# Patient Record
Sex: FEMALE | Race: WHITE | Employment: OTHER | URBAN - NONMETROPOLITAN AREA
[De-identification: names, ages, dates, MRNs, and addresses within clinical notes are randomized per-mention and may not be internally consistent; named-entity substitution may affect disease eponyms.]

---

## 2017-03-22 ENCOUNTER — OFFICE VISIT (OUTPATIENT)
Dept: OBGYN | Age: 77
End: 2017-03-22
Payer: MEDICARE

## 2017-03-22 VITALS
BODY MASS INDEX: 25.65 KG/M2 | HEART RATE: 60 BPM | DIASTOLIC BLOOD PRESSURE: 60 MMHG | HEIGHT: 67 IN | SYSTOLIC BLOOD PRESSURE: 116 MMHG | WEIGHT: 163.4 LBS

## 2017-03-22 DIAGNOSIS — N95.2 VAGINAL ATROPHY: ICD-10-CM

## 2017-03-22 DIAGNOSIS — B96.89 BV (BACTERIAL VAGINOSIS): ICD-10-CM

## 2017-03-22 DIAGNOSIS — N76.0 BV (BACTERIAL VAGINOSIS): ICD-10-CM

## 2017-03-22 DIAGNOSIS — N81.4 UTERINE PROLAPSE: Primary | ICD-10-CM

## 2017-03-22 PROCEDURE — 3014F SCREEN MAMMO DOC REV: CPT | Performed by: OBSTETRICS & GYNECOLOGY

## 2017-03-22 PROCEDURE — 4040F PNEUMOC VAC/ADMIN/RCVD: CPT | Performed by: OBSTETRICS & GYNECOLOGY

## 2017-03-22 PROCEDURE — G8400 PT W/DXA NO RESULTS DOC: HCPCS | Performed by: OBSTETRICS & GYNECOLOGY

## 2017-03-22 PROCEDURE — 1036F TOBACCO NON-USER: CPT | Performed by: OBSTETRICS & GYNECOLOGY

## 2017-03-22 PROCEDURE — 1090F PRES/ABSN URINE INCON ASSESS: CPT | Performed by: OBSTETRICS & GYNECOLOGY

## 2017-03-22 PROCEDURE — 99213 OFFICE O/P EST LOW 20 MIN: CPT | Performed by: OBSTETRICS & GYNECOLOGY

## 2017-03-22 PROCEDURE — G8427 DOCREV CUR MEDS BY ELIG CLIN: HCPCS | Performed by: OBSTETRICS & GYNECOLOGY

## 2017-03-22 PROCEDURE — 3017F COLORECTAL CA SCREEN DOC REV: CPT | Performed by: OBSTETRICS & GYNECOLOGY

## 2017-03-22 PROCEDURE — G8484 FLU IMMUNIZE NO ADMIN: HCPCS | Performed by: OBSTETRICS & GYNECOLOGY

## 2017-03-22 PROCEDURE — 1123F ACP DISCUSS/DSCN MKR DOCD: CPT | Performed by: OBSTETRICS & GYNECOLOGY

## 2017-03-22 PROCEDURE — G8420 CALC BMI NORM PARAMETERS: HCPCS | Performed by: OBSTETRICS & GYNECOLOGY

## 2017-03-22 RX ORDER — METRONIDAZOLE 7.5 MG/G
GEL VAGINAL
Qty: 1 TUBE | Refills: 0 | Status: SHIPPED | OUTPATIENT
Start: 2017-03-22 | End: 2017-03-29

## 2017-04-24 ENCOUNTER — OFFICE VISIT (OUTPATIENT)
Dept: OBGYN | Age: 77
End: 2017-04-24
Payer: MEDICARE

## 2017-04-24 VITALS
HEIGHT: 67 IN | WEIGHT: 163 LBS | HEART RATE: 60 BPM | SYSTOLIC BLOOD PRESSURE: 122 MMHG | RESPIRATION RATE: 16 BRPM | BODY MASS INDEX: 25.58 KG/M2 | DIASTOLIC BLOOD PRESSURE: 76 MMHG

## 2017-04-24 DIAGNOSIS — N81.4 UTERINE PROLAPSE: Primary | ICD-10-CM

## 2017-04-24 DIAGNOSIS — N95.2 VAGINAL ATROPHY: ICD-10-CM

## 2017-04-24 PROCEDURE — G8400 PT W/DXA NO RESULTS DOC: HCPCS | Performed by: OBSTETRICS & GYNECOLOGY

## 2017-04-24 PROCEDURE — 99213 OFFICE O/P EST LOW 20 MIN: CPT | Performed by: OBSTETRICS & GYNECOLOGY

## 2017-04-24 PROCEDURE — G8420 CALC BMI NORM PARAMETERS: HCPCS | Performed by: OBSTETRICS & GYNECOLOGY

## 2017-04-24 PROCEDURE — 4040F PNEUMOC VAC/ADMIN/RCVD: CPT | Performed by: OBSTETRICS & GYNECOLOGY

## 2017-04-24 PROCEDURE — 3017F COLORECTAL CA SCREEN DOC REV: CPT | Performed by: OBSTETRICS & GYNECOLOGY

## 2017-04-24 PROCEDURE — 1123F ACP DISCUSS/DSCN MKR DOCD: CPT | Performed by: OBSTETRICS & GYNECOLOGY

## 2017-04-24 PROCEDURE — 1036F TOBACCO NON-USER: CPT | Performed by: OBSTETRICS & GYNECOLOGY

## 2017-04-24 PROCEDURE — 1090F PRES/ABSN URINE INCON ASSESS: CPT | Performed by: OBSTETRICS & GYNECOLOGY

## 2017-04-24 PROCEDURE — G8427 DOCREV CUR MEDS BY ELIG CLIN: HCPCS | Performed by: OBSTETRICS & GYNECOLOGY

## 2017-07-27 ENCOUNTER — OFFICE VISIT (OUTPATIENT)
Dept: OBGYN | Age: 77
End: 2017-07-27
Payer: MEDICARE

## 2017-07-27 VITALS
HEART RATE: 52 BPM | DIASTOLIC BLOOD PRESSURE: 50 MMHG | BODY MASS INDEX: 25.24 KG/M2 | WEIGHT: 160.8 LBS | HEIGHT: 67 IN | SYSTOLIC BLOOD PRESSURE: 100 MMHG

## 2017-07-27 DIAGNOSIS — B37.31 CANDIDIASIS OF FEMALE GENITALIA: ICD-10-CM

## 2017-07-27 DIAGNOSIS — N81.4 CYSTOCELE WITH UTERINE DESCENSUS: Primary | ICD-10-CM

## 2017-07-27 PROCEDURE — 99213 OFFICE O/P EST LOW 20 MIN: CPT | Performed by: OBSTETRICS & GYNECOLOGY

## 2017-07-27 RX ORDER — CYCLOBENZAPRINE HCL 10 MG
10 TABLET ORAL NIGHTLY
COMMUNITY

## 2017-07-27 RX ORDER — FLUCONAZOLE 150 MG/1
150 TABLET ORAL ONCE
Qty: 1 TABLET | Refills: 0 | Status: SHIPPED | OUTPATIENT
Start: 2017-07-27 | End: 2017-07-27

## 2017-09-07 ENCOUNTER — OFFICE VISIT (OUTPATIENT)
Dept: OBGYN | Age: 77
End: 2017-09-07
Payer: MEDICARE

## 2017-09-07 VITALS
HEART RATE: 76 BPM | HEIGHT: 67 IN | SYSTOLIC BLOOD PRESSURE: 120 MMHG | BODY MASS INDEX: 24.96 KG/M2 | RESPIRATION RATE: 16 BRPM | DIASTOLIC BLOOD PRESSURE: 72 MMHG | WEIGHT: 159 LBS

## 2017-09-07 DIAGNOSIS — N81.4 CYSTOCELE WITH UTERINE DESCENSUS: Primary | ICD-10-CM

## 2017-09-07 PROCEDURE — 4040F PNEUMOC VAC/ADMIN/RCVD: CPT | Performed by: OBSTETRICS & GYNECOLOGY

## 2017-09-07 PROCEDURE — 1036F TOBACCO NON-USER: CPT | Performed by: OBSTETRICS & GYNECOLOGY

## 2017-09-07 PROCEDURE — G8400 PT W/DXA NO RESULTS DOC: HCPCS | Performed by: OBSTETRICS & GYNECOLOGY

## 2017-09-07 PROCEDURE — 3017F COLORECTAL CA SCREEN DOC REV: CPT | Performed by: OBSTETRICS & GYNECOLOGY

## 2017-09-07 PROCEDURE — 99213 OFFICE O/P EST LOW 20 MIN: CPT | Performed by: OBSTETRICS & GYNECOLOGY

## 2017-09-07 PROCEDURE — G8420 CALC BMI NORM PARAMETERS: HCPCS | Performed by: OBSTETRICS & GYNECOLOGY

## 2017-09-07 PROCEDURE — G8427 DOCREV CUR MEDS BY ELIG CLIN: HCPCS | Performed by: OBSTETRICS & GYNECOLOGY

## 2017-09-07 PROCEDURE — 1123F ACP DISCUSS/DSCN MKR DOCD: CPT | Performed by: OBSTETRICS & GYNECOLOGY

## 2017-09-07 PROCEDURE — 1090F PRES/ABSN URINE INCON ASSESS: CPT | Performed by: OBSTETRICS & GYNECOLOGY

## 2017-09-07 RX ORDER — FLUCONAZOLE 150 MG/1
150 TABLET ORAL ONCE
Qty: 1 TABLET | Refills: 1 | Status: SHIPPED | OUTPATIENT
Start: 2017-09-07 | End: 2017-09-07

## 2017-12-20 ENCOUNTER — OFFICE VISIT (OUTPATIENT)
Dept: OBGYN | Age: 77
End: 2017-12-20
Payer: MEDICARE

## 2017-12-20 VITALS
RESPIRATION RATE: 16 BRPM | WEIGHT: 158 LBS | SYSTOLIC BLOOD PRESSURE: 112 MMHG | HEART RATE: 60 BPM | BODY MASS INDEX: 23.95 KG/M2 | DIASTOLIC BLOOD PRESSURE: 68 MMHG | HEIGHT: 68 IN

## 2017-12-20 DIAGNOSIS — N81.89 PELVIC RELAXATION DISORDER: Primary | ICD-10-CM

## 2017-12-20 PROCEDURE — 99214 OFFICE O/P EST MOD 30 MIN: CPT | Performed by: OBSTETRICS & GYNECOLOGY

## 2018-04-05 ENCOUNTER — OFFICE VISIT (OUTPATIENT)
Dept: OBGYN | Age: 78
End: 2018-04-05
Payer: MEDICARE

## 2018-04-05 VITALS
RESPIRATION RATE: 16 BRPM | HEART RATE: 60 BPM | DIASTOLIC BLOOD PRESSURE: 64 MMHG | WEIGHT: 163 LBS | SYSTOLIC BLOOD PRESSURE: 118 MMHG | BODY MASS INDEX: 25.58 KG/M2 | HEIGHT: 67 IN

## 2018-04-05 DIAGNOSIS — N81.89 PELVIC RELAXATION DISORDER: Primary | ICD-10-CM

## 2018-04-05 PROCEDURE — G8427 DOCREV CUR MEDS BY ELIG CLIN: HCPCS | Performed by: OBSTETRICS & GYNECOLOGY

## 2018-04-05 PROCEDURE — 1090F PRES/ABSN URINE INCON ASSESS: CPT | Performed by: OBSTETRICS & GYNECOLOGY

## 2018-04-05 PROCEDURE — 99213 OFFICE O/P EST LOW 20 MIN: CPT | Performed by: OBSTETRICS & GYNECOLOGY

## 2018-04-05 PROCEDURE — G8419 CALC BMI OUT NRM PARAM NOF/U: HCPCS | Performed by: OBSTETRICS & GYNECOLOGY

## 2018-04-05 PROCEDURE — G8400 PT W/DXA NO RESULTS DOC: HCPCS | Performed by: OBSTETRICS & GYNECOLOGY

## 2018-04-05 PROCEDURE — 4040F PNEUMOC VAC/ADMIN/RCVD: CPT | Performed by: OBSTETRICS & GYNECOLOGY

## 2018-04-05 PROCEDURE — 1123F ACP DISCUSS/DSCN MKR DOCD: CPT | Performed by: OBSTETRICS & GYNECOLOGY

## 2018-04-05 PROCEDURE — 1036F TOBACCO NON-USER: CPT | Performed by: OBSTETRICS & GYNECOLOGY

## 2018-08-30 ENCOUNTER — PROCEDURE VISIT (OUTPATIENT)
Dept: OBGYN | Age: 78
End: 2018-08-30
Payer: MEDICARE

## 2018-08-30 VITALS — HEART RATE: 58 BPM | DIASTOLIC BLOOD PRESSURE: 58 MMHG | SYSTOLIC BLOOD PRESSURE: 110 MMHG

## 2018-08-30 DIAGNOSIS — T83.69XA VAGINAL INFLAMMATION FROM PESSARY, INITIAL ENCOUNTER (HCC): ICD-10-CM

## 2018-08-30 DIAGNOSIS — Z46.89 PESSARY MAINTENANCE: Primary | ICD-10-CM

## 2018-08-30 DIAGNOSIS — N76.0 VAGINAL INFLAMMATION FROM PESSARY, INITIAL ENCOUNTER (HCC): ICD-10-CM

## 2018-08-30 DIAGNOSIS — B37.31 CANDIDIASIS OF VULVA AND VAGINA: ICD-10-CM

## 2018-08-30 PROCEDURE — 99213 OFFICE O/P EST LOW 20 MIN: CPT | Performed by: OBSTETRICS & GYNECOLOGY

## 2018-09-11 ENCOUNTER — PROCEDURE VISIT (OUTPATIENT)
Dept: OBGYN | Age: 78
End: 2018-09-11
Payer: MEDICARE

## 2018-09-11 VITALS
HEIGHT: 67 IN | SYSTOLIC BLOOD PRESSURE: 120 MMHG | HEART RATE: 58 BPM | WEIGHT: 160 LBS | DIASTOLIC BLOOD PRESSURE: 64 MMHG | BODY MASS INDEX: 25.11 KG/M2

## 2018-09-11 DIAGNOSIS — N81.4 UTERINE PROLAPSE: ICD-10-CM

## 2018-09-11 DIAGNOSIS — Z46.89 PESSARY MAINTENANCE: Primary | ICD-10-CM

## 2018-09-11 DIAGNOSIS — T83.69XD VAGINAL INFLAMMATION FROM PESSARY, SUBSEQUENT ENCOUNTER: ICD-10-CM

## 2018-09-11 DIAGNOSIS — N76.0 VAGINAL INFLAMMATION FROM PESSARY, SUBSEQUENT ENCOUNTER: ICD-10-CM

## 2018-09-11 PROCEDURE — A4562 PESSARY, NON RUBBER,ANY TYPE: HCPCS | Performed by: OBSTETRICS & GYNECOLOGY

## 2018-09-11 PROCEDURE — 99212 OFFICE O/P EST SF 10 MIN: CPT | Performed by: OBSTETRICS & GYNECOLOGY

## 2018-12-17 ENCOUNTER — PROCEDURE VISIT (OUTPATIENT)
Dept: OBGYN | Age: 78
End: 2018-12-17
Payer: MEDICARE

## 2018-12-17 VITALS
HEART RATE: 78 BPM | RESPIRATION RATE: 18 BRPM | BODY MASS INDEX: 25.27 KG/M2 | HEIGHT: 67 IN | SYSTOLIC BLOOD PRESSURE: 122 MMHG | DIASTOLIC BLOOD PRESSURE: 82 MMHG | WEIGHT: 161 LBS

## 2018-12-17 DIAGNOSIS — Z46.89 PESSARY MAINTENANCE: ICD-10-CM

## 2018-12-17 DIAGNOSIS — M81.8 OTHER OSTEOPOROSIS WITHOUT CURRENT PATHOLOGICAL FRACTURE: ICD-10-CM

## 2018-12-17 DIAGNOSIS — N81.4 CYSTOCELE WITH UTERINE DESCENSUS: Primary | ICD-10-CM

## 2018-12-17 PROCEDURE — 99214 OFFICE O/P EST MOD 30 MIN: CPT | Performed by: OBSTETRICS & GYNECOLOGY

## 2018-12-17 RX ORDER — ASPIRIN 81 MG/1
81 TABLET, CHEWABLE ORAL
COMMUNITY

## 2018-12-17 RX ORDER — CLINDAMYCIN PHOSPHATE 20 MG/G
CREAM VAGINAL
Qty: 1 TUBE | Refills: 1 | Status: SHIPPED | OUTPATIENT
Start: 2018-12-17 | End: 2020-06-20

## 2019-03-14 ENCOUNTER — HOSPITAL ENCOUNTER (OUTPATIENT)
Dept: BONE DENSITY | Age: 79
Discharge: HOME | End: 2019-03-16
Payer: MEDICARE

## 2019-03-14 DIAGNOSIS — M81.8 OTHER OSTEOPOROSIS WITHOUT CURRENT PATHOLOGICAL FRACTURE: ICD-10-CM

## 2019-03-14 PROCEDURE — 77085 DXA BONE DENSITY AXL VRT FX: CPT

## 2019-03-18 ENCOUNTER — PROCEDURE VISIT (OUTPATIENT)
Dept: OBGYN | Age: 79
End: 2019-03-18
Payer: MEDICARE

## 2019-03-18 VITALS
BODY MASS INDEX: 25.58 KG/M2 | HEART RATE: 48 BPM | SYSTOLIC BLOOD PRESSURE: 118 MMHG | DIASTOLIC BLOOD PRESSURE: 60 MMHG | WEIGHT: 163 LBS | HEIGHT: 67 IN

## 2019-03-18 DIAGNOSIS — N81.4 CYSTOCELE WITH UTERINE DESCENSUS: ICD-10-CM

## 2019-03-18 DIAGNOSIS — Z46.89 PESSARY MAINTENANCE: Primary | ICD-10-CM

## 2019-03-18 DIAGNOSIS — N81.89 PELVIC RELAXATION DISORDER: ICD-10-CM

## 2019-03-18 PROCEDURE — 99212 OFFICE O/P EST SF 10 MIN: CPT | Performed by: OBSTETRICS & GYNECOLOGY

## 2019-05-29 ENCOUNTER — TELEPHONE (OUTPATIENT)
Dept: OBGYN | Age: 79
End: 2019-05-29

## 2019-05-29 RX ORDER — CLOTRIMAZOLE AND BETAMETHASONE DIPROPIONATE 10; .64 MG/G; MG/G
CREAM TOPICAL
Qty: 1 TUBE | Refills: 0 | Status: SHIPPED | OUTPATIENT
Start: 2019-05-29 | End: 2022-01-12

## 2019-05-29 NOTE — TELEPHONE ENCOUNTER
Patient sees . Patient called and has a yeast infection and wondering if  could write a prescription. Please call patient to let her know at [phone].

## 2019-07-02 ENCOUNTER — OFFICE VISIT (OUTPATIENT)
Dept: OBGYN | Age: 79
End: 2019-07-02
Payer: MEDICARE

## 2019-07-02 VITALS
BODY MASS INDEX: 25.27 KG/M2 | HEIGHT: 67 IN | HEART RATE: 60 BPM | SYSTOLIC BLOOD PRESSURE: 132 MMHG | DIASTOLIC BLOOD PRESSURE: 82 MMHG | WEIGHT: 161 LBS | RESPIRATION RATE: 20 BRPM

## 2019-07-02 DIAGNOSIS — N89.8 VAGINAL DISCHARGE: ICD-10-CM

## 2019-07-02 DIAGNOSIS — Z46.89 PESSARY MAINTENANCE: ICD-10-CM

## 2019-07-02 DIAGNOSIS — N81.89 PELVIC RELAXATION DISORDER: Primary | ICD-10-CM

## 2019-07-02 PROCEDURE — 99213 OFFICE O/P EST LOW 20 MIN: CPT | Performed by: OBSTETRICS & GYNECOLOGY

## 2019-07-02 RX ORDER — NYSTATIN 100000 U/G
CREAM TOPICAL
Qty: 1 TUBE | Refills: 0 | Status: SHIPPED | OUTPATIENT
Start: 2019-07-02 | End: 2019-07-09

## 2019-07-02 NOTE — PROGRESS NOTES
Pessary Cleaning    Jody Galicia  7/2/2019  10:49 AM  Chief Complaint   Patient presents with    Gynecologic Exam     pessary check     Allergies   Allergen Reactions    Simvastatin     Statins     Zestril [Lisinopril] Other (See Comments)     Cough       Past Medical History:   Diagnosis Date    Cystocele     History of rheumatic fever     at age of 3    HTN (hypertension)     Osteoporosis     Uterine prolapse      Past Surgical History:   Procedure Laterality Date    BREAST BIOPSY Right     benign     Family History   Problem Relation Age of Onset    Hearing Loss Mother     High Blood Pressure Brother       reports that she has never smoked. She has never used smokeless tobacco. She reports that she drinks alcohol. She reports that she does not use drugs. Current Outpatient Medications:     nystatin (MYCOSTATIN) 711823 UNIT/GM cream, Apply topically 2 times daily. , Disp: 1 Tube, Rfl: 0    clotrimazole-betamethasone (LOTRISONE) 1-0.05 % cream, Apply a thin film to the affected area 2 times daily. , Disp: 1 Tube, Rfl: 0    aspirin 81 MG chewable tablet, Take 81 mg by mouth, Disp: , Rfl:     clindamycin (CLEOCIN) 2 % vaginal cream, Place vaginally nightly for 7 nights, Disp: 1 Tube, Rfl: 1    cyclobenzaprine (FLEXERIL) 10 MG tablet, Take 10 mg by mouth nightly, Disp: , Rfl:     traMADol-acetaminophen (ULTRACET) 37.5-325 MG per tablet, Take 1 tablet by mouth every 8 hours as needed for Pain, Disp: , Rfl:     oxyquinolone (TRIMO-ASKEW) 0.025 % GEL, Place 1 applicator vaginally once a week, Disp: , Rfl:     metoprolol tartrate (LOPRESSOR) 50 MG tablet, Metoprolol Tartrate 50 MG Oral Tablet TAKE 1 AND 1/2 TABLET BY MOUTH TWICE A DAY  Quantity: 180 Refills: 1   Simon Martinez M.D.;  Started Active, Disp: , Rfl:     sertraline (ZOLOFT) 50 MG tablet, , Disp: , Rfl:     amLODIPine (NORVASC) 10 MG tablet, TAKE ONE TABLET BY MOUTH DAILY, Disp: , Rfl:     losartan-hydrochlorothiazide (HYZAAR) 100-25 MG

## 2019-11-18 ENCOUNTER — PROCEDURE VISIT (OUTPATIENT)
Dept: OBGYN | Age: 79
End: 2019-11-18
Payer: MEDICARE

## 2019-11-18 VITALS
HEIGHT: 67 IN | DIASTOLIC BLOOD PRESSURE: 78 MMHG | SYSTOLIC BLOOD PRESSURE: 122 MMHG | WEIGHT: 161 LBS | RESPIRATION RATE: 20 BRPM | HEART RATE: 76 BPM | BODY MASS INDEX: 25.27 KG/M2

## 2019-11-18 DIAGNOSIS — Z46.89 PESSARY MAINTENANCE: ICD-10-CM

## 2019-11-18 DIAGNOSIS — N81.89 PELVIC RELAXATION DISORDER: Primary | ICD-10-CM

## 2019-11-18 PROCEDURE — 99213 OFFICE O/P EST LOW 20 MIN: CPT | Performed by: OBSTETRICS & GYNECOLOGY

## 2020-05-11 ENCOUNTER — PROCEDURE VISIT (OUTPATIENT)
Dept: OBGYN | Age: 80
End: 2020-05-11
Payer: MEDICARE

## 2020-05-11 VITALS
DIASTOLIC BLOOD PRESSURE: 84 MMHG | WEIGHT: 165 LBS | BODY MASS INDEX: 25.01 KG/M2 | HEART RATE: 78 BPM | HEIGHT: 68 IN | RESPIRATION RATE: 20 BRPM | SYSTOLIC BLOOD PRESSURE: 124 MMHG

## 2020-05-11 PROCEDURE — G8400 PT W/DXA NO RESULTS DOC: HCPCS | Performed by: OBSTETRICS & GYNECOLOGY

## 2020-05-11 PROCEDURE — 4040F PNEUMOC VAC/ADMIN/RCVD: CPT | Performed by: OBSTETRICS & GYNECOLOGY

## 2020-05-11 PROCEDURE — 99214 OFFICE O/P EST MOD 30 MIN: CPT

## 2020-05-11 PROCEDURE — 1090F PRES/ABSN URINE INCON ASSESS: CPT | Performed by: OBSTETRICS & GYNECOLOGY

## 2020-05-11 PROCEDURE — 99214 OFFICE O/P EST MOD 30 MIN: CPT | Performed by: OBSTETRICS & GYNECOLOGY

## 2020-05-11 PROCEDURE — 1123F ACP DISCUSS/DSCN MKR DOCD: CPT | Performed by: OBSTETRICS & GYNECOLOGY

## 2020-05-11 PROCEDURE — 1036F TOBACCO NON-USER: CPT | Performed by: OBSTETRICS & GYNECOLOGY

## 2020-05-11 PROCEDURE — G8427 DOCREV CUR MEDS BY ELIG CLIN: HCPCS | Performed by: OBSTETRICS & GYNECOLOGY

## 2020-05-11 PROCEDURE — G8417 CALC BMI ABV UP PARAM F/U: HCPCS | Performed by: OBSTETRICS & GYNECOLOGY

## 2020-05-11 RX ORDER — OXYQUINOLINE/BORIC ACID 0.025 %
1 JELLY WITH APPLICATOR (GRAM) VAGINAL WEEKLY
Qty: 1 TUBE | Refills: 3 | Status: SHIPPED | OUTPATIENT
Start: 2020-05-11 | End: 2022-08-11

## 2020-06-18 ENCOUNTER — OFFICE VISIT (OUTPATIENT)
Dept: OBGYN | Age: 80
End: 2020-06-18
Payer: MEDICARE

## 2020-06-18 ENCOUNTER — HOSPITAL ENCOUNTER (OUTPATIENT)
Age: 80
Setting detail: SPECIMEN
Discharge: HOME | End: 2020-06-18
Payer: MEDICARE

## 2020-06-18 VITALS
HEART RATE: 78 BPM | HEIGHT: 68 IN | SYSTOLIC BLOOD PRESSURE: 128 MMHG | BODY MASS INDEX: 26.98 KG/M2 | TEMPERATURE: 97.8 F | RESPIRATION RATE: 20 BRPM | DIASTOLIC BLOOD PRESSURE: 78 MMHG | WEIGHT: 178 LBS

## 2020-06-18 PROCEDURE — G8427 DOCREV CUR MEDS BY ELIG CLIN: HCPCS | Performed by: OBSTETRICS & GYNECOLOGY

## 2020-06-18 PROCEDURE — 99214 OFFICE O/P EST MOD 30 MIN: CPT | Performed by: OBSTETRICS & GYNECOLOGY

## 2020-06-18 PROCEDURE — G8417 CALC BMI ABV UP PARAM F/U: HCPCS | Performed by: OBSTETRICS & GYNECOLOGY

## 2020-06-18 PROCEDURE — G8400 PT W/DXA NO RESULTS DOC: HCPCS | Performed by: OBSTETRICS & GYNECOLOGY

## 2020-06-18 PROCEDURE — 99214 OFFICE O/P EST MOD 30 MIN: CPT

## 2020-06-18 PROCEDURE — 87070 CULTURE OTHR SPECIMN AEROBIC: CPT

## 2020-06-18 PROCEDURE — 1090F PRES/ABSN URINE INCON ASSESS: CPT | Performed by: OBSTETRICS & GYNECOLOGY

## 2020-06-18 PROCEDURE — 4040F PNEUMOC VAC/ADMIN/RCVD: CPT | Performed by: OBSTETRICS & GYNECOLOGY

## 2020-06-18 PROCEDURE — 1036F TOBACCO NON-USER: CPT | Performed by: OBSTETRICS & GYNECOLOGY

## 2020-06-18 PROCEDURE — 1123F ACP DISCUSS/DSCN MKR DOCD: CPT | Performed by: OBSTETRICS & GYNECOLOGY

## 2020-06-18 RX ORDER — CLINDAMYCIN PHOSPHATE 20 MG/G
CREAM VAGINAL
Qty: 1 TUBE | Refills: 0 | Status: SHIPPED | OUTPATIENT
Start: 2020-06-18 | End: 2020-10-05 | Stop reason: SDUPTHER

## 2020-06-18 NOTE — PROGRESS NOTES
Subjective:      Patient ID: Zuly Benton  is a 68 y.o. y.o. female. Had a pessary for pelvic relaxation problem for a long time however recently she started to have some bleeding with possible irritation or infection however there is no pain. Since this started she stopped using the Trimo-Tejeda gel    Vaginal Bleeding   This is a recurrent problem. The current episode started more than 1 month ago. The problem occurs intermittently. The problem has been waxing and waning. Nothing aggravates the symptoms. She has tried nothing for the symptoms. The treatment provided no relief. Chief Complaint   Patient presents with    Vaginal Bleeding     having a lot of bleeding at times      Family History   Problem Relation Age of Onset    Hearing Loss Mother     High Blood Pressure Brother      Past Medical History:   Diagnosis Date    Cystocele     History of rheumatic fever     at age of 3    HTN (hypertension)     Osteoporosis     Uterine prolapse      Past Surgical History:   Procedure Laterality Date    BREAST BIOPSY Right     benign      reports that she has never smoked. She has never used smokeless tobacco. She reports current alcohol use. She reports that she does not use drugs. Allergies   Allergen Reactions    Simvastatin     Statins     Zestril [Lisinopril] Other (See Comments)     Cough         Current Outpatient Medications:     clindamycin (CLEOCIN) 2 % vaginal cream, Place vaginally twice daily for 7 days, Disp: 1 Tube, Rfl: 0    oxyquinolone (TRIMO-TEJEDA) 0.025 % GEL, Place 1 applicator vaginally once a week, Disp: 1 Tube, Rfl: 3    clotrimazole-betamethasone (LOTRISONE) 1-0.05 % cream, Apply a thin film to the affected area 2 times daily. , Disp: 1 Tube, Rfl: 0    aspirin 81 MG chewable tablet, Take 81 mg by mouth, Disp: , Rfl:     cyclobenzaprine (FLEXERIL) 10 MG tablet, Take 10 mg by mouth nightly, Disp: , Rfl:     traMADol-acetaminophen (ULTRACET) 37.5-325 MG per tablet, Take 1 tablet by mouth every 8 hours as needed for Pain, Disp: , Rfl:     metoprolol tartrate (LOPRESSOR) 50 MG tablet, Metoprolol Tartrate 50 MG Oral Tablet TAKE 1 AND 1/2 TABLET BY MOUTH TWICE A DAY  Quantity: 180 Refills: 1   Jewel Aguilar M.D.;  Started Active, Disp: , Rfl:     sertraline (ZOLOFT) 50 MG tablet, , Disp: , Rfl:     amLODIPine (NORVASC) 10 MG tablet, TAKE ONE TABLET BY MOUTH DAILY, Disp: , Rfl:     losartan-hydrochlorothiazide (HYZAAR) 100-25 MG per tablet, Take 1 tablet by mouth, Disp: , Rfl:     spironolactone (ALDACTONE) 25 MG tablet, Take 25 mg by mouth, Disp: , Rfl:     Glucosamine Sulfate 1000 MG CAPS, Take 1 tablet by mouth, Disp: , Rfl:     FLUZONE HIGH-DOSE 0.5 ML ALEX injection, inject 0.5 milliliter intramuscularly, Disp: , Rfl: 0    POTASSIUM CHLORIDE PO, Take by mouth, Disp: , Rfl:     docusate sodium (COLACE) 100 MG capsule, Take 100 mg by mouth daily. , Disp: , Rfl:     aspirin 81 MG tablet, Take 81 mg by mouth daily. , Disp: , Rfl:     CALCIUM-VITAMIN D PO, Take  by mouth., Disp: , Rfl:     GARLIC, by Does not apply route., Disp: , Rfl:     clindamycin (CLEOCIN) 2 % vaginal cream, Place vaginally nightly for 7 nights (Patient not taking: Reported on 5/11/2020), Disp: 1 Tube, Rfl: 1      Review of Systems   Genitourinary: Positive for vaginal bleeding. All other systems reviewed and are negative. Breast ROS: negative    Objective:   /78 (Site: Right Upper Arm, Position: Sitting, Cuff Size: Medium Adult)   Pulse 78   Temp 97.8 °F (36.6 °C) (Temporal)   Resp 20   Ht 5' 7.72\" (1.72 m)   Wt 178 lb (80.7 kg)   BMI 27.29 kg/m²     Physical Exam  Vitals signs and nursing note reviewed. Constitutional:       General: She is not in acute distress. Appearance: Normal appearance. Eyes:      Conjunctiva/sclera: Conjunctivae normal.      Pupils: Pupils are equal, round, and reactive to light. Neck:      Musculoskeletal: Normal range of motion and neck supple. Pulmonary:      Effort: Pulmonary effort is normal. No respiratory distress. Abdominal:      Palpations: Abdomen is soft. Tenderness: There is no abdominal tenderness. Genitourinary:     General: Normal vulva. Comments: The vulva and lower vagina looks normal, the the pessary was removed, there was some bloody discharge and the speculum  examination reveals the upper part of the the vagina red irritated and there is a an area that bleeds on touch at around 7 O'Clock,  no cervical motion tenderness, and no masses could be felt and no tenderness  Musculoskeletal: Normal range of motion. General: No deformity. Skin:     General: Skin is warm. Neurological:      General: No focal deficit present. Mental Status: She is alert and oriented to person, place, and time. Psychiatric:         Mood and Affect: Mood normal.         Behavior: Behavior normal.       Breast exam: WNL, No skin retraction, dimpling or skin discoloration. No nippledischarge. Any bleeding or pain withintercourse: N    Do you do self breast exams: Yes    Assessment:      Diagnosis Orders   1. Vaginal irritation from pessary  Culture, Genital   2. Postmenopausal bleeding     3.  Pelvic relaxation disorder             Plan:     Orders Placed This Encounter   Procedures    Culture, Genital     Standing Status:   Future     Standing Expiration Date:   6/18/2021     We will keep the pessary out treat the patient with Cleocin cream for 7 days then the patient will come back in 2 weeks for recheck if it is all healed up we will replace the pessary       Shae Dash MD

## 2020-06-20 ENCOUNTER — OFFICE VISIT (OUTPATIENT)
Dept: PRIMARY CARE CLINIC | Age: 80
End: 2020-06-20
Payer: MEDICARE

## 2020-06-20 ENCOUNTER — HOSPITAL ENCOUNTER (OUTPATIENT)
Age: 80
Setting detail: SPECIMEN
Discharge: HOME | End: 2020-06-20
Payer: MEDICARE

## 2020-06-20 VITALS
TEMPERATURE: 98.8 F | HEIGHT: 68 IN | OXYGEN SATURATION: 97 % | SYSTOLIC BLOOD PRESSURE: 128 MMHG | DIASTOLIC BLOOD PRESSURE: 60 MMHG | RESPIRATION RATE: 16 BRPM | BODY MASS INDEX: 24.86 KG/M2 | WEIGHT: 164 LBS | HEART RATE: 70 BPM

## 2020-06-20 LAB
-: ABNORMAL
AMORPHOUS: ABNORMAL
BACTERIA: ABNORMAL
BILIRUBIN URINE: NEGATIVE
CASTS UA: ABNORMAL /LPF (ref 0–2)
COLOR: ABNORMAL
COMMENT UA: ABNORMAL
CRYSTALS, UA: ABNORMAL /HPF
EPITHELIAL CELLS UA: ABNORMAL /HPF (ref 0–5)
GLUCOSE URINE: NEGATIVE
KETONES, URINE: NEGATIVE
LEUKOCYTE ESTERASE, URINE: ABNORMAL
MUCUS: ABNORMAL
NITRITE, URINE: NEGATIVE
OTHER OBSERVATIONS UA: ABNORMAL
PH UA: 5.5 (ref 5–6)
PROTEIN UA: ABNORMAL
RBC UA: >50 /HPF (ref 0–4)
RENAL EPITHELIAL, UA: ABNORMAL /HPF
SPECIFIC GRAVITY UA: 1.02 (ref 1.01–1.02)
TRICHOMONAS: ABNORMAL
TURBIDITY: ABNORMAL
URINE HGB: ABNORMAL
UROBILINOGEN, URINE: NORMAL
WBC UA: >50 /HPF (ref 0–4)
YEAST: ABNORMAL

## 2020-06-20 PROCEDURE — 87186 SC STD MICRODIL/AGAR DIL: CPT

## 2020-06-20 PROCEDURE — 87088 URINE BACTERIA CULTURE: CPT

## 2020-06-20 PROCEDURE — 1123F ACP DISCUSS/DSCN MKR DOCD: CPT | Performed by: FAMILY MEDICINE

## 2020-06-20 PROCEDURE — G8420 CALC BMI NORM PARAMETERS: HCPCS | Performed by: FAMILY MEDICINE

## 2020-06-20 PROCEDURE — G8400 PT W/DXA NO RESULTS DOC: HCPCS | Performed by: FAMILY MEDICINE

## 2020-06-20 PROCEDURE — 99203 OFFICE O/P NEW LOW 30 MIN: CPT | Performed by: FAMILY MEDICINE

## 2020-06-20 PROCEDURE — 81001 URINALYSIS AUTO W/SCOPE: CPT

## 2020-06-20 PROCEDURE — 4040F PNEUMOC VAC/ADMIN/RCVD: CPT | Performed by: FAMILY MEDICINE

## 2020-06-20 PROCEDURE — 99212 OFFICE O/P EST SF 10 MIN: CPT

## 2020-06-20 PROCEDURE — 1036F TOBACCO NON-USER: CPT | Performed by: FAMILY MEDICINE

## 2020-06-20 PROCEDURE — 1090F PRES/ABSN URINE INCON ASSESS: CPT | Performed by: FAMILY MEDICINE

## 2020-06-20 PROCEDURE — G8427 DOCREV CUR MEDS BY ELIG CLIN: HCPCS | Performed by: FAMILY MEDICINE

## 2020-06-20 PROCEDURE — 87086 URINE CULTURE/COLONY COUNT: CPT

## 2020-06-20 RX ORDER — CEPHALEXIN 500 MG/1
500 CAPSULE ORAL 3 TIMES DAILY
Qty: 21 CAPSULE | Refills: 0 | Status: SHIPPED | OUTPATIENT
Start: 2020-06-20 | End: 2020-06-27

## 2020-06-20 ASSESSMENT — ENCOUNTER SYMPTOMS
NAUSEA: 0
VOMITING: 0
SHORTNESS OF BREATH: 0
COUGH: 0
BACK PAIN: 1

## 2020-06-20 ASSESSMENT — PATIENT HEALTH QUESTIONNAIRE - PHQ9
SUM OF ALL RESPONSES TO PHQ QUESTIONS 1-9: 0
SUM OF ALL RESPONSES TO PHQ9 QUESTIONS 1 & 2: 0
SUM OF ALL RESPONSES TO PHQ QUESTIONS 1-9: 0
1. LITTLE INTEREST OR PLEASURE IN DOING THINGS: 0
2. FEELING DOWN, DEPRESSED OR HOPELESS: 0

## 2020-06-20 NOTE — PROGRESS NOTES
Musculoskeletal: Normal range of motion and neck supple. Thyroid: No thyromegaly. Cardiovascular:      Rate and Rhythm: Normal rate and regular rhythm. Heart sounds: Normal heart sounds. No murmur. Pulmonary:      Effort: Pulmonary effort is normal. No respiratory distress. Breath sounds: Normal breath sounds. No wheezing. Lymphadenopathy:      Cervical: No cervical adenopathy. Skin:     General: Skin is warm and dry. Findings: No erythema or rash. Neurological:      Mental Status: She is alert and oriented to person, place, and time. /60   Pulse 70   Temp 98.8 °F (37.1 °C)   Resp 16   Ht 5' 8\" (1.727 m)   Wt 164 lb (74.4 kg)   SpO2 97%   BMI 24.94 kg/m²     Assessment:       Diagnosis Orders   1. Acute cystitis with hematuria     2.  Painful urination  Urinalysis Reflex to Culture      Hospital Outpatient Visit on 06/20/2020   Component Date Value Ref Range Status    Color, UA 06/20/2020 NOT REPORTED  YELLOW Final    Turbidity UA 06/20/2020 NOT REPORTED  CLEAR Final    Glucose, Ur 06/20/2020 NEGATIVE  NEGATIVE Final    Bilirubin Urine 06/20/2020 NEGATIVE  NEGATIVE Final    Ketones, Urine 06/20/2020 NEGATIVE  NEGATIVE Final    Specific Gravity, UA 06/20/2020 1.025  1.010 - 1.025 Final    Urine Hgb 06/20/2020 3+* NEGATIVE Final    pH, UA 06/20/2020 5.5  5.0 - 6.0 Final    Protein, UA 06/20/2020 TRACE* NEGATIVE Final    Urobilinogen, Urine 06/20/2020 Normal  Normal Final    Nitrite, Urine 06/20/2020 NEGATIVE  NEGATIVE Final    Leukocyte Esterase, Urine 06/20/2020 2+* NEGATIVE Final    Urinalysis Comments 06/20/2020 NOT REPORTED   Final    - 06/20/2020        Final    WBC, UA 06/20/2020 >50  0 - 4 /HPF Final    RBC, UA 06/20/2020 >50  0 - 4 /HPF Final    Casts UA 06/20/2020 NOT REPORTED  0 - 2 /LPF Final    Crystals, UA 06/20/2020 NOT REPORTED  None /HPF Final    Epithelial Cells UA 06/20/2020 5 TO 10  0 - 5 /HPF Final    Renal Epithelial, UA

## 2020-06-21 LAB
CULTURE: ABNORMAL
Lab: ABNORMAL
SPECIMEN DESCRIPTION: ABNORMAL

## 2020-06-22 LAB
CULTURE: ABNORMAL
Lab: ABNORMAL
SPECIMEN DESCRIPTION: ABNORMAL

## 2020-06-29 ENCOUNTER — OFFICE VISIT (OUTPATIENT)
Dept: OBGYN | Age: 80
End: 2020-06-29
Payer: MEDICARE

## 2020-06-29 VITALS
WEIGHT: 163.14 LBS | RESPIRATION RATE: 20 BRPM | BODY MASS INDEX: 24.73 KG/M2 | TEMPERATURE: 97.1 F | HEART RATE: 78 BPM | DIASTOLIC BLOOD PRESSURE: 82 MMHG | HEIGHT: 68 IN | SYSTOLIC BLOOD PRESSURE: 124 MMHG

## 2020-06-29 PROCEDURE — 99214 OFFICE O/P EST MOD 30 MIN: CPT

## 2020-06-29 PROCEDURE — G8400 PT W/DXA NO RESULTS DOC: HCPCS | Performed by: OBSTETRICS & GYNECOLOGY

## 2020-06-29 PROCEDURE — G8420 CALC BMI NORM PARAMETERS: HCPCS | Performed by: OBSTETRICS & GYNECOLOGY

## 2020-06-29 PROCEDURE — 1090F PRES/ABSN URINE INCON ASSESS: CPT | Performed by: OBSTETRICS & GYNECOLOGY

## 2020-06-29 PROCEDURE — 1036F TOBACCO NON-USER: CPT | Performed by: OBSTETRICS & GYNECOLOGY

## 2020-06-29 PROCEDURE — G8427 DOCREV CUR MEDS BY ELIG CLIN: HCPCS | Performed by: OBSTETRICS & GYNECOLOGY

## 2020-06-29 PROCEDURE — 4040F PNEUMOC VAC/ADMIN/RCVD: CPT | Performed by: OBSTETRICS & GYNECOLOGY

## 2020-06-29 PROCEDURE — 1123F ACP DISCUSS/DSCN MKR DOCD: CPT | Performed by: OBSTETRICS & GYNECOLOGY

## 2020-06-29 PROCEDURE — 99213 OFFICE O/P EST LOW 20 MIN: CPT | Performed by: OBSTETRICS & GYNECOLOGY

## 2020-06-29 RX ORDER — OXYQUINOLINE/BORIC ACID 0.025 %
1 JELLY WITH APPLICATOR (GRAM) VAGINAL
Qty: 1 TUBE | Refills: 3 | Status: SHIPPED | OUTPATIENT
Start: 2020-06-29 | End: 2022-08-11

## 2020-06-29 NOTE — PROGRESS NOTES
Pessary Cleaning    [facility]  6/29/2020  11:58 AM  Chief Complaint   Patient presents with    Procedure     pessary check doing much better     Allergies   Allergen Reactions    Simvastatin     Statins     Zestril [Lisinopril] Other (See Comments)     Cough       Past Medical History:   Diagnosis Date    Cystocele     History of rheumatic fever     at age of 3    HTN (hypertension)     Osteoporosis     Uterine prolapse      Past Surgical History:   Procedure Laterality Date    BREAST BIOPSY Right     benign     Family History   Problem Relation Age of Onset    Hearing Loss Mother     High Blood Pressure Brother       reports that she has never smoked. She has never used smokeless tobacco. She reports current alcohol use. She reports that she does not use drugs. Current Outpatient Medications:     oxyquinolone (TRIMO-ASKEW) 0.025 % GEL, Place 1 applicator vaginally twice a week twice weekly, Disp: 1 Tube, Rfl: 3    terconazole (TERAZOL 7) 0.4 % vaginal cream, Place 1 applicator vaginally nightly for 7 days Place vaginally nightly., Disp: 1 Tube, Rfl: 0    clindamycin (CLEOCIN) 2 % vaginal cream, Place vaginally twice daily for 7 days, Disp: 1 Tube, Rfl: 0    oxyquinolone (TRIMO-ASKEW) 0.025 % GEL, Place 1 applicator vaginally once a week, Disp: 1 Tube, Rfl: 3    clotrimazole-betamethasone (LOTRISONE) 1-0.05 % cream, Apply a thin film to the affected area 2 times daily. , Disp: 1 Tube, Rfl: 0    aspirin 81 MG chewable tablet, Take 81 mg by mouth, Disp: , Rfl:     cyclobenzaprine (FLEXERIL) 10 MG tablet, Take 10 mg by mouth nightly, Disp: , Rfl:     metoprolol tartrate (LOPRESSOR) 50 MG tablet, Metoprolol Tartrate 50 MG Oral Tablet TAKE 1 AND 1/2 TABLET BY MOUTH TWICE A DAY  Quantity: 180 Refills: 1   Avelino Hu M.D.;  Started Active, Disp: , Rfl:     sertraline (ZOLOFT) 50 MG tablet, , Disp: , Rfl:     amLODIPine (NORVASC) 10 MG tablet, TAKE ONE TABLET BY MOUTH DAILY, Disp: , Rfl:    losartan-hydrochlorothiazide (HYZAAR) 100-25 MG per tablet, Take 1 tablet by mouth, Disp: , Rfl:     spironolactone (ALDACTONE) 25 MG tablet, Take 25 mg by mouth, Disp: , Rfl:     Glucosamine Sulfate 1000 MG CAPS, Take 1 tablet by mouth, Disp: , Rfl:     FLUZONE HIGH-DOSE 0.5 ML ALEX injection, inject 0.5 milliliter intramuscularly, Disp: , Rfl: 0    POTASSIUM CHLORIDE PO, Take by mouth, Disp: , Rfl:     docusate sodium (COLACE) 100 MG capsule, Take 100 mg by mouth daily. , Disp: , Rfl:     CALCIUM-VITAMIN D PO, Take  by mouth., Disp: , Rfl:     GARLIC, by Does not apply route., Disp: , Rfl:     traMADol-acetaminophen (ULTRACET) 37.5-325 MG per tablet, Take 1 tablet by mouth every 8 hours as needed for Pain, Disp: , Rfl:           Review of Systems   All other systems reviewed and are negative. Physical Exam  Vitals signs and nursing note reviewed. Constitutional:       General: She is not in acute distress. Appearance: Normal appearance. Eyes:      Conjunctiva/sclera: Conjunctivae normal.      Pupils: Pupils are equal, round, and reactive to light. Neck:      Musculoskeletal: Normal range of motion and neck supple. Pulmonary:      Effort: Pulmonary effort is normal. No respiratory distress. Abdominal:      Tenderness: There is no abdominal tenderness. Genitourinary:     General: Normal vulva. Vagina: No vaginal discharge. Musculoskeletal: Normal range of motion. General: No deformity. Skin:     General: Skin is warm. Neurological:      General: No focal deficit present. Mental Status: She is alert and oriented to person, place, and time. Psychiatric:         Mood and Affect: Mood normal.         Behavior: Behavior normal.       Nora Elizondo is a 68 y.o. female       The patient is being seen today for pessary cleaning/maintenance. She has been fitted for a # 2; Ring with support type pessary.   She states all of her symptoms that she had prior to the pessary have been relieved with its use. She denies any vaginal bleeding. Was treated with Cleocin cream for the past couple weeks feel because of irritation and bleeding. She denies to any vaginal discharge or odor. Her bowels are regular and her voiding pattern is normal.     /82 (Site: Right Upper Arm, Position: Sitting, Cuff Size: Medium Adult)   Pulse 78   Temp 97.1 °F (36.2 °C) (Temporal)   Resp 20   Ht 5' 7.99\" (1.727 m)   Wt 163 lb 2.3 oz (74 kg)   BMI 24.81 kg/m²       Abdomen: Soft and non-tender; good bowel sounds; no guarding, rebound or rigidity; no CVA tenderness bilaterally. Extremities: No calf tenderness bilaterally. DTR 2/4 bilaterally. No edema. Perineum/Speculum: There is not any signs of infection; The vaginal vault is without any signs of erythema or erosion. There is not vaginal discharge or odor appreciated. The pessary was cleansed and replaced without any problems and the patient tolerated the procedure well. Lidocaine 2% gel was used to help with patient discomfort during removal and placement. Trimosan gel was placed with the pessary to decrease discharge/odor. Assessment:   Diagnosis Orders   1. Pelvic relaxation disorder     2. Pessary maintenance     3. Vaginal irritation from pessary       Chief Complaint   Patient presents with    Procedure     pessary check doing much better     Patient Active Problem List    Diagnosis Date Noted    Cystocele with uterine descensus 07/24/2013    Osteoporosis 07/24/2013    Hypertension 07/24/2013         Plan:  1. Return to the office 8-12 weeks  2. Report any vaginal bleeding or discharge      Patient was seen with total face to face time of 20 minutes. More than 50% of this visit was on counseling and education regarding her    Diagnosis Orders   1. Pelvic relaxation disorder     2. Pessary maintenance     3. Vaginal irritation from pessary      and her options.  She was also counseled on her preventative health maintenance recommendations and follow-up.

## 2020-08-14 ENCOUNTER — HOSPITAL ENCOUNTER (OUTPATIENT)
Dept: ULTRASOUND IMAGING | Age: 80
Discharge: HOME | End: 2020-08-16
Payer: MEDICARE

## 2020-08-14 PROCEDURE — 76830 TRANSVAGINAL US NON-OB: CPT

## 2020-08-14 PROCEDURE — 76856 US EXAM PELVIC COMPLETE: CPT

## 2020-10-05 ENCOUNTER — PROCEDURE VISIT (OUTPATIENT)
Dept: OBGYN | Age: 80
End: 2020-10-05
Payer: MEDICARE

## 2020-10-05 VITALS
BODY MASS INDEX: 25.74 KG/M2 | HEART RATE: 84 BPM | WEIGHT: 164 LBS | SYSTOLIC BLOOD PRESSURE: 134 MMHG | DIASTOLIC BLOOD PRESSURE: 78 MMHG | HEIGHT: 67 IN | RESPIRATION RATE: 20 BRPM

## 2020-10-05 PROCEDURE — G8417 CALC BMI ABV UP PARAM F/U: HCPCS | Performed by: OBSTETRICS & GYNECOLOGY

## 2020-10-05 PROCEDURE — 1090F PRES/ABSN URINE INCON ASSESS: CPT | Performed by: OBSTETRICS & GYNECOLOGY

## 2020-10-05 PROCEDURE — 4040F PNEUMOC VAC/ADMIN/RCVD: CPT | Performed by: OBSTETRICS & GYNECOLOGY

## 2020-10-05 PROCEDURE — G8400 PT W/DXA NO RESULTS DOC: HCPCS | Performed by: OBSTETRICS & GYNECOLOGY

## 2020-10-05 PROCEDURE — 1036F TOBACCO NON-USER: CPT | Performed by: OBSTETRICS & GYNECOLOGY

## 2020-10-05 PROCEDURE — 1123F ACP DISCUSS/DSCN MKR DOCD: CPT | Performed by: OBSTETRICS & GYNECOLOGY

## 2020-10-05 PROCEDURE — G8427 DOCREV CUR MEDS BY ELIG CLIN: HCPCS | Performed by: OBSTETRICS & GYNECOLOGY

## 2020-10-05 PROCEDURE — 99213 OFFICE O/P EST LOW 20 MIN: CPT | Performed by: OBSTETRICS & GYNECOLOGY

## 2020-10-05 PROCEDURE — G8484 FLU IMMUNIZE NO ADMIN: HCPCS | Performed by: OBSTETRICS & GYNECOLOGY

## 2020-10-05 PROCEDURE — 99214 OFFICE O/P EST MOD 30 MIN: CPT

## 2020-10-05 RX ORDER — CLINDAMYCIN PHOSPHATE 20 MG/G
CREAM VAGINAL
Qty: 1 TUBE | Refills: 3 | Status: SHIPPED | OUTPATIENT
Start: 2020-10-05 | End: 2022-08-11

## 2020-10-05 NOTE — PROGRESS NOTES
Pessary Cleaning    Tyron Coleman  10/5/2020  11:56 AM  Chief Complaint   Patient presents with    Procedure     pessary check and cleaning. stopped using the trimo luu     Allergies   Allergen Reactions    Simvastatin     Statins     Zestril [Lisinopril] Other (See Comments)     Cough       Past Medical History:   Diagnosis Date    Cystocele     History of rheumatic fever     at age of 3    HTN (hypertension)     Osteoporosis     Uterine prolapse      Past Surgical History:   Procedure Laterality Date    BREAST BIOPSY Right     benign     Family History   Problem Relation Age of Onset    Hearing Loss Mother     High Blood Pressure Brother       reports that she has never smoked. She has never used smokeless tobacco. She reports current alcohol use. She reports that she does not use drugs. Current Outpatient Medications:     clindamycin (CLEOCIN) 2 % vaginal cream, Place vaginally twice weekly, Disp: 1 Tube, Rfl: 3    clotrimazole-betamethasone (LOTRISONE) 1-0.05 % cream, Apply a thin film to the affected area 2 times daily. , Disp: 1 Tube, Rfl: 0    aspirin 81 MG chewable tablet, Take 81 mg by mouth, Disp: , Rfl:     cyclobenzaprine (FLEXERIL) 10 MG tablet, Take 10 mg by mouth nightly, Disp: , Rfl:     traMADol-acetaminophen (ULTRACET) 37.5-325 MG per tablet, Take 1 tablet by mouth every 8 hours as needed for Pain, Disp: , Rfl:     metoprolol tartrate (LOPRESSOR) 50 MG tablet, Metoprolol Tartrate 50 MG Oral Tablet TAKE 1 AND 1/2 TABLET BY MOUTH TWICE A DAY  Quantity: 180 Refills: 1   Miroslava Alberts M.D.;  Started Active, Disp: , Rfl:     sertraline (ZOLOFT) 50 MG tablet, , Disp: , Rfl:     amLODIPine (NORVASC) 10 MG tablet, TAKE ONE TABLET BY MOUTH DAILY, Disp: , Rfl:     losartan-hydrochlorothiazide (HYZAAR) 100-25 MG per tablet, Take 1 tablet by mouth, Disp: , Rfl:     spironolactone (ALDACTONE) 25 MG tablet, Take 25 mg by mouth, Disp: , Rfl:     Glucosamine Sulfate 1000 MG CAPS, Take 1 tablet by mouth, Disp: , Rfl:     FLUZONE HIGH-DOSE 0.5 ML ALEX injection, inject 0.5 milliliter intramuscularly, Disp: , Rfl: 0    POTASSIUM CHLORIDE PO, Take by mouth, Disp: , Rfl:     docusate sodium (COLACE) 100 MG capsule, Take 100 mg by mouth daily. , Disp: , Rfl:     CALCIUM-VITAMIN D PO, Take  by mouth., Disp: , Rfl:     GARLIC, by Does not apply route., Disp: , Rfl:     oxyquinolone (TRIMO-ASKEW) 0.025 % GEL, Place 1 applicator vaginally twice a week twice weekly (Patient not taking: Reported on 10/5/2020), Disp: 1 Tube, Rfl: 3    oxyquinolone (TRIMO-ASKEW) 0.025 % GEL, Place 1 applicator vaginally once a week (Patient not taking: Reported on 10/5/2020), Disp: 1 Tube, Rfl: 3          Review of Systems   All other systems reviewed and are negative. Physical Exam  Vitals signs and nursing note reviewed. Exam conducted with a chaperone present. Constitutional:       General: She is not in acute distress. Appearance: Normal appearance. Eyes:      Conjunctiva/sclera: Conjunctivae normal.      Pupils: Pupils are equal, round, and reactive to light. Neck:      Musculoskeletal: Normal range of motion and neck supple. Pulmonary:      Effort: Pulmonary effort is normal. No respiratory distress. Abdominal:      Palpations: Abdomen is soft. Tenderness: There is no abdominal tenderness. Genitourinary:     General: Normal vulva. Vagina: No vaginal discharge. Musculoskeletal: Normal range of motion. General: No deformity. Skin:     General: Skin is warm. Neurological:      General: No focal deficit present. Mental Status: She is alert and oriented to person, place, and time. Psychiatric:         Mood and Affect: Mood normal.         Behavior: Behavior normal.       Laith Gill is a 68 y.o. female       The patient is being seen today for pessary cleaning/maintenance. She has been fitted for a # 2; ring type pessary.   She states all of her symptoms that she had prior to the pessary have been relieved with its use. She denies any vaginal bleeding. She last had her pessary cleaned 4 months ago. She denies to any vaginal discharge or odor. Her bowels are regular and her voiding pattern is normal.     /78 (Site: Right Upper Arm, Position: Sitting, Cuff Size: Medium Adult)   Pulse 84   Resp 20   Ht 5' 7\" (1.702 m)   Wt 164 lb (74.4 kg)   BMI 25.69 kg/m²       Abdomen: Soft and non-tender; good bowel sounds; no guarding, rebound or rigidity; no CVA tenderness bilaterally. Extremities: No calf tenderness bilaterally. DTR 2/4 bilaterally. No edema. Perineum/Speculum: There is not any signs of infection; The vaginal vault is without any signs of erythema or erosion. There is not vaginal discharge or odor appreciated. The pessary was cleansed and replaced without any problems and the patient tolerated the procedure well. Lidocaine 2% gel was used to help with patient discomfort during removal and placement. Trimosan gel was placed with the pessary to decrease discharge/odor. Assessment:   Diagnosis Orders   1. Pelvic relaxation disorder     2. Pessary maintenance       Chief Complaint   Patient presents with    Procedure     pessary check and cleaning. stopped using the trimo tejeda     Patient Active Problem List    Diagnosis Date Noted    Cystocele with uterine descensus 07/24/2013    Osteoporosis 07/24/2013    Hypertension 07/24/2013         Plan:  1. Return to the office 12- 16 weeks  2. Report any vaginal bleeding or discharge      Patient was seen with total face to face time of 15 minutes. More than 50% of this visit was on counseling and education regarding her    Diagnosis Orders   1. Pelvic relaxation disorder     2. Pessary maintenance      and her options. She was also counseled on her preventative health maintenance recommendations and follow-up.   The patient does not want to use the Trimo-Tejeda cream because she said it was giving her some cramping so instead will give her Cleocin cream to use also twice a week

## 2020-11-03 ENCOUNTER — PROCEDURE VISIT (OUTPATIENT)
Dept: OBGYN | Age: 80
End: 2020-11-03
Payer: MEDICARE

## 2020-11-03 VITALS
WEIGHT: 163.8 LBS | HEART RATE: 64 BPM | SYSTOLIC BLOOD PRESSURE: 122 MMHG | DIASTOLIC BLOOD PRESSURE: 60 MMHG | BODY MASS INDEX: 25.71 KG/M2 | OXYGEN SATURATION: 96 % | HEIGHT: 67 IN

## 2020-11-03 PROCEDURE — 1123F ACP DISCUSS/DSCN MKR DOCD: CPT | Performed by: OBSTETRICS & GYNECOLOGY

## 2020-11-03 PROCEDURE — 4040F PNEUMOC VAC/ADMIN/RCVD: CPT | Performed by: OBSTETRICS & GYNECOLOGY

## 2020-11-03 PROCEDURE — G8427 DOCREV CUR MEDS BY ELIG CLIN: HCPCS | Performed by: OBSTETRICS & GYNECOLOGY

## 2020-11-03 PROCEDURE — 1036F TOBACCO NON-USER: CPT | Performed by: OBSTETRICS & GYNECOLOGY

## 2020-11-03 PROCEDURE — G8400 PT W/DXA NO RESULTS DOC: HCPCS | Performed by: OBSTETRICS & GYNECOLOGY

## 2020-11-03 PROCEDURE — 1090F PRES/ABSN URINE INCON ASSESS: CPT | Performed by: OBSTETRICS & GYNECOLOGY

## 2020-11-03 PROCEDURE — G8484 FLU IMMUNIZE NO ADMIN: HCPCS | Performed by: OBSTETRICS & GYNECOLOGY

## 2020-11-03 PROCEDURE — 99214 OFFICE O/P EST MOD 30 MIN: CPT | Performed by: OBSTETRICS & GYNECOLOGY

## 2020-11-03 PROCEDURE — 99214 OFFICE O/P EST MOD 30 MIN: CPT

## 2020-11-03 PROCEDURE — G8417 CALC BMI ABV UP PARAM F/U: HCPCS | Performed by: OBSTETRICS & GYNECOLOGY

## 2020-11-03 RX ORDER — ALENDRONATE SODIUM 70 MG/1
TABLET ORAL
COMMUNITY
Start: 2020-09-22

## 2020-11-03 NOTE — PROGRESS NOTES
Pessary Cleaning    Marie Rodriguez  11/3/2020  9:01 AM  Chief Complaint   Patient presents with    Procedure     pessary      Allergies   Allergen Reactions    Simvastatin     Statins     Zestril [Lisinopril] Other (See Comments)     Cough       Past Medical History:   Diagnosis Date    Cystocele     History of rheumatic fever     at age of 3    HTN (hypertension)     Osteoporosis     Uterine prolapse      Past Surgical History:   Procedure Laterality Date    BREAST BIOPSY Right     benign     Family History   Problem Relation Age of Onset    Hearing Loss Mother     High Blood Pressure Brother       reports that she has never smoked. She has never used smokeless tobacco. She reports current alcohol use. She reports that she does not use drugs. Current Outpatient Medications:     clindamycin (CLEOCIN) 2 % vaginal cream, Place vaginally twice weekly, Disp: 1 Tube, Rfl: 3    oxyquinolone (TRIMO-ASKEW) 0.025 % GEL, Place 1 applicator vaginally twice a week twice weekly (Patient not taking: Reported on 10/5/2020), Disp: 1 Tube, Rfl: 3    oxyquinolone (TRIMO-ASKEW) 0.025 % GEL, Place 1 applicator vaginally once a week (Patient not taking: Reported on 10/5/2020), Disp: 1 Tube, Rfl: 3    clotrimazole-betamethasone (LOTRISONE) 1-0.05 % cream, Apply a thin film to the affected area 2 times daily. , Disp: 1 Tube, Rfl: 0    aspirin 81 MG chewable tablet, Take 81 mg by mouth, Disp: , Rfl:     cyclobenzaprine (FLEXERIL) 10 MG tablet, Take 10 mg by mouth nightly, Disp: , Rfl:     traMADol-acetaminophen (ULTRACET) 37.5-325 MG per tablet, Take 1 tablet by mouth every 8 hours as needed for Pain, Disp: , Rfl:     metoprolol tartrate (LOPRESSOR) 50 MG tablet, Metoprolol Tartrate 50 MG Oral Tablet TAKE 1 AND 1/2 TABLET BY MOUTH TWICE A DAY  Quantity: 180 Refills: 1   Sha Mcknight M.D.;  Started Active, Disp: , Rfl:     sertraline (ZOLOFT) 50 MG tablet, , Disp: , Rfl:     amLODIPine (NORVASC) 10 MG tablet, TAKE ONE TABLET BY MOUTH DAILY, Disp: , Rfl:     losartan-hydrochlorothiazide (HYZAAR) 100-25 MG per tablet, Take 1 tablet by mouth, Disp: , Rfl:     spironolactone (ALDACTONE) 25 MG tablet, Take 25 mg by mouth, Disp: , Rfl:     Glucosamine Sulfate 1000 MG CAPS, Take 1 tablet by mouth, Disp: , Rfl:     FLUZONE HIGH-DOSE 0.5 ML ALEX injection, inject 0.5 milliliter intramuscularly, Disp: , Rfl: 0    POTASSIUM CHLORIDE PO, Take by mouth, Disp: , Rfl:     docusate sodium (COLACE) 100 MG capsule, Take 100 mg by mouth daily. , Disp: , Rfl:     CALCIUM-VITAMIN D PO, Take  by mouth., Disp: , Rfl:     GARLIC, by Does not apply route., Disp: , Rfl:           Review of Systems   All other systems reviewed and are negative. Physical Exam  Vitals signs and nursing note reviewed. Exam conducted with a chaperone present. Constitutional:       General: She is not in acute distress. Appearance: Normal appearance. Eyes:      Conjunctiva/sclera: Conjunctivae normal.      Pupils: Pupils are equal, round, and reactive to light. Neck:      Musculoskeletal: Normal range of motion and neck supple. Pulmonary:      Effort: Pulmonary effort is normal. No respiratory distress. Abdominal:      Palpations: Abdomen is soft. Tenderness: There is no abdominal tenderness. Genitourinary:     General: Normal vulva. Vagina: No vaginal discharge. Musculoskeletal: Normal range of motion. General: No deformity. Skin:     General: Skin is warm. Neurological:      General: No focal deficit present. Mental Status: She is alert and oriented to person, place, and time. Psychiatric:         Mood and Affect: Mood normal.         Behavior: Behavior normal.       Natalia Claire is a 68 y.o. female       The patient is being seen today for pessary cleaning/maintenance. She has been fitted for a # 2; Ring type pessary.   She feels it is too small as it could coming down very low in her vaginal area and she

## 2021-01-27 ENCOUNTER — PROCEDURE VISIT (OUTPATIENT)
Dept: OBGYN | Age: 81
End: 2021-01-27
Payer: MEDICARE

## 2021-01-27 VITALS
DIASTOLIC BLOOD PRESSURE: 82 MMHG | RESPIRATION RATE: 20 BRPM | WEIGHT: 168 LBS | BODY MASS INDEX: 26.37 KG/M2 | HEIGHT: 67 IN | HEART RATE: 78 BPM | SYSTOLIC BLOOD PRESSURE: 138 MMHG

## 2021-01-27 DIAGNOSIS — Z46.89 PESSARY MAINTENANCE: ICD-10-CM

## 2021-01-27 DIAGNOSIS — N81.89 PELVIC RELAXATION DISORDER: Primary | ICD-10-CM

## 2021-01-27 PROCEDURE — G8417 CALC BMI ABV UP PARAM F/U: HCPCS | Performed by: OBSTETRICS & GYNECOLOGY

## 2021-01-27 PROCEDURE — 99214 OFFICE O/P EST MOD 30 MIN: CPT

## 2021-01-27 PROCEDURE — 4040F PNEUMOC VAC/ADMIN/RCVD: CPT | Performed by: OBSTETRICS & GYNECOLOGY

## 2021-01-27 PROCEDURE — G8400 PT W/DXA NO RESULTS DOC: HCPCS | Performed by: OBSTETRICS & GYNECOLOGY

## 2021-01-27 PROCEDURE — 1123F ACP DISCUSS/DSCN MKR DOCD: CPT | Performed by: OBSTETRICS & GYNECOLOGY

## 2021-01-27 PROCEDURE — 99213 OFFICE O/P EST LOW 20 MIN: CPT | Performed by: OBSTETRICS & GYNECOLOGY

## 2021-01-27 PROCEDURE — G8484 FLU IMMUNIZE NO ADMIN: HCPCS | Performed by: OBSTETRICS & GYNECOLOGY

## 2021-01-27 PROCEDURE — G8427 DOCREV CUR MEDS BY ELIG CLIN: HCPCS | Performed by: OBSTETRICS & GYNECOLOGY

## 2021-01-27 PROCEDURE — 1036F TOBACCO NON-USER: CPT | Performed by: OBSTETRICS & GYNECOLOGY

## 2021-01-27 PROCEDURE — 1090F PRES/ABSN URINE INCON ASSESS: CPT | Performed by: OBSTETRICS & GYNECOLOGY

## 2021-01-27 NOTE — PROGRESS NOTES
Pessary Cleaning    Anabell Reis  1/27/2021  9:26 AM  No chief complaint on file. Allergies   Allergen Reactions    Simvastatin     Statins     Zestril [Lisinopril] Other (See Comments)     Cough       Past Medical History:   Diagnosis Date    Cystocele     History of rheumatic fever     at age of 3    HTN (hypertension)     Osteoporosis     Uterine prolapse      Past Surgical History:   Procedure Laterality Date    BREAST BIOPSY Right     benign     Family History   Problem Relation Age of Onset    Hearing Loss Mother     High Blood Pressure Brother       reports that she has never smoked. She has never used smokeless tobacco. She reports current alcohol use. She reports that she does not use drugs. Current Outpatient Medications:     alendronate (FOSAMAX) 70 MG tablet, , Disp: , Rfl:     clindamycin (CLEOCIN) 2 % vaginal cream, Place vaginally twice weekly (Patient not taking: Reported on 11/3/2020), Disp: 1 Tube, Rfl: 3    oxyquinolone (TRIMO-ASKEW) 0.025 % GEL, Place 1 applicator vaginally twice a week twice weekly, Disp: 1 Tube, Rfl: 3    oxyquinolone (TRIMO-ASKEW) 0.025 % GEL, Place 1 applicator vaginally once a week, Disp: 1 Tube, Rfl: 3    clotrimazole-betamethasone (LOTRISONE) 1-0.05 % cream, Apply a thin film to the affected area 2 times daily. , Disp: 1 Tube, Rfl: 0    aspirin 81 MG chewable tablet, Take 81 mg by mouth, Disp: , Rfl:     cyclobenzaprine (FLEXERIL) 10 MG tablet, Take 10 mg by mouth nightly, Disp: , Rfl:     traMADol-acetaminophen (ULTRACET) 37.5-325 MG per tablet, Take 1 tablet by mouth every 8 hours as needed for Pain, Disp: , Rfl:     metoprolol tartrate (LOPRESSOR) 50 MG tablet, Metoprolol Tartrate 50 MG Oral Tablet TAKE 1 AND 1/2 TABLET BY MOUTH TWICE A DAY  Quantity: 180 Refills: 1   Velma Lai M.D.;  Started Active, Disp: , Rfl:     sertraline (ZOLOFT) 50 MG tablet, , Disp: , Rfl:   amLODIPine (NORVASC) 10 MG tablet, TAKE ONE TABLET BY MOUTH DAILY, Disp: , Rfl:     losartan-hydrochlorothiazide (HYZAAR) 100-25 MG per tablet, Take 1 tablet by mouth, Disp: , Rfl:     spironolactone (ALDACTONE) 25 MG tablet, Take 25 mg by mouth, Disp: , Rfl:     Glucosamine Sulfate 1000 MG CAPS, Take 1 tablet by mouth, Disp: , Rfl:     FLUZONE HIGH-DOSE 0.5 ML ALEX injection, inject 0.5 milliliter intramuscularly, Disp: , Rfl: 0    POTASSIUM CHLORIDE PO, Take by mouth, Disp: , Rfl:     docusate sodium (COLACE) 100 MG capsule, Take 100 mg by mouth daily. , Disp: , Rfl:     CALCIUM-VITAMIN D PO, Take  by mouth., Disp: , Rfl:     GARLIC, by Does not apply route., Disp: , Rfl:           Review of Systems   All other systems reviewed and are negative. Physical Exam  Vitals signs and nursing note reviewed. Exam conducted with a chaperone present. Constitutional:       General: She is not in acute distress. Appearance: Normal appearance. She is not ill-appearing. HENT:      Head: Normocephalic. Eyes:      Conjunctiva/sclera: Conjunctivae normal.      Pupils: Pupils are equal, round, and reactive to light. Neck:      Musculoskeletal: Normal range of motion and neck supple. Pulmonary:      Effort: Pulmonary effort is normal. No respiratory distress. Abdominal:      General: There is no distension. Palpations: Abdomen is soft. Tenderness: There is no abdominal tenderness. There is no guarding. Genitourinary:     General: Normal vulva. Vagina: No vaginal discharge. Musculoskeletal: Normal range of motion. Skin:     General: Skin is warm. Neurological:      General: No focal deficit present. Mental Status: She is alert and oriented to person, place, and time.    Psychiatric:         Mood and Affect: Mood normal.         Behavior: Behavior normal.       Jarek Francois is a 68 y.o. female [id number] The patient is being seen today for pessary cleaning/maintenance. She has been fitted for a # 2; Ring type pessary. She states all of her symptoms that she had prior to the pessary have been relieved with its use. She denies any vaginal bleeding. She last had her pessary cleaned 4 months ago. She denies to any vaginal discharge or odor. Her bowels are regular and her voiding pattern is normal.     There were no vitals taken for this visit. Abdomen: Soft and non-tender; good bowel sounds; no guarding, rebound or rigidity; no CVA tenderness bilaterally. Extremities: No calf tenderness bilaterally. DTR 2/4 bilaterally. No edema. Perineum/Speculum: There is not any signs of infection; The vaginal vault is without any signs of erythema or erosion. There is not vaginal discharge or odor appreciated. The pessary was cleansed and replaced without any problems and the patient tolerated the procedure well. Lidocaine 2% gel was used to help with patient discomfort during removal and placement. Trimosan gel was placed with the pessary to decrease discharge/odor. Assessment:   Diagnosis Orders   1. Pelvic relaxation disorder     2. Pessary maintenance       No chief complaint on file. Patient Active Problem List    Diagnosis Date Noted    Pessary maintenance 01/27/2021    Cystocele with uterine descensus 07/24/2013    Osteoporosis 07/24/2013    Hypertension 07/24/2013         Plan:  1. Return to the office 12-16 weeks  2. Report any vaginal bleeding or discharge      Patient was seen with total face to face time of 15 minutes. More than 50% of this visit was on counseling and education regarding her    Diagnosis Orders   1. Pelvic relaxation disorder     2. Pessary maintenance      and her options. She was also counseled on her preventative health maintenance recommendations and follow-up.

## 2021-07-07 ENCOUNTER — PROCEDURE VISIT (OUTPATIENT)
Dept: OBGYN | Age: 81
End: 2021-07-07
Payer: MEDICARE

## 2021-07-07 VITALS
WEIGHT: 160.2 LBS | HEIGHT: 67 IN | SYSTOLIC BLOOD PRESSURE: 120 MMHG | BODY MASS INDEX: 25.15 KG/M2 | OXYGEN SATURATION: 98 % | HEART RATE: 56 BPM | DIASTOLIC BLOOD PRESSURE: 60 MMHG

## 2021-07-07 DIAGNOSIS — N81.4 CYSTOCELE WITH UTERINE DESCENSUS: Primary | ICD-10-CM

## 2021-07-07 DIAGNOSIS — Z96.0 PRESENCE OF PESSARY: ICD-10-CM

## 2021-07-07 PROCEDURE — G8417 CALC BMI ABV UP PARAM F/U: HCPCS | Performed by: OBSTETRICS & GYNECOLOGY

## 2021-07-07 PROCEDURE — 1123F ACP DISCUSS/DSCN MKR DOCD: CPT | Performed by: OBSTETRICS & GYNECOLOGY

## 2021-07-07 PROCEDURE — 99214 OFFICE O/P EST MOD 30 MIN: CPT

## 2021-07-07 PROCEDURE — 4040F PNEUMOC VAC/ADMIN/RCVD: CPT | Performed by: OBSTETRICS & GYNECOLOGY

## 2021-07-07 PROCEDURE — 99213 OFFICE O/P EST LOW 20 MIN: CPT | Performed by: OBSTETRICS & GYNECOLOGY

## 2021-07-07 PROCEDURE — 1036F TOBACCO NON-USER: CPT | Performed by: OBSTETRICS & GYNECOLOGY

## 2021-07-07 PROCEDURE — 1090F PRES/ABSN URINE INCON ASSESS: CPT | Performed by: OBSTETRICS & GYNECOLOGY

## 2021-07-07 PROCEDURE — G8400 PT W/DXA NO RESULTS DOC: HCPCS | Performed by: OBSTETRICS & GYNECOLOGY

## 2021-07-07 PROCEDURE — G8427 DOCREV CUR MEDS BY ELIG CLIN: HCPCS | Performed by: OBSTETRICS & GYNECOLOGY

## 2021-07-07 RX ORDER — ATORVASTATIN CALCIUM 40 MG/1
40 TABLET, FILM COATED ORAL DAILY
COMMUNITY
Start: 2021-05-28

## 2021-07-07 NOTE — PROGRESS NOTES
Estuardo Found  2021  10:11 AM          Estuardo Found is a 80 y.o. female       The patient was seen. She has no chief complaints today. She has been fitted for a # 3; ring with support type pessary. She states all of her symptoms that she had prior to the pessary have been relieved with its use. She denies any vaginal bleeding. She last had her pessary cleaned 16 weeks ago. She denies to any vaginal discharge or odor. Her bowels are regular and her voiding pattern is normal.     Blood pressure 120/60, pulse 56, height 5' 7\" (1.702 m), weight 160 lb 3.2 oz (72.7 kg), SpO2 98 %, not currently breastfeeding. Chaperone for Intimate Exam   Chaperone was offered and accepted as part of the rooming process.  Chaperone: More        Abdomen: Soft and non-tender; good bowel sounds; no guarding, rebound or rigidity; no CVA tenderness bilaterally. Extremities: No calf tenderness bilaterally. DTR 2/4 bilaterally. No edema. Perineum/Speculum: There is not any signs of infection; The vaginal vault is without any signs of erythema or erosion. There is no vaginal discharge or odor appreciated. The pessary was cleansed and replaced without any problems and the patient tolerated the procedure well. T.O.S. Ointment was placed with the pessary to decrease discharge/odor. Assessment:   Diagnosis Orders   1. Cystocele with uterine descensus     2. Presence of pessary #3 Ring with Support       Chief Complaint   Patient presents with    Procedure     pessary     Patient Active Problem List    Diagnosis Date Noted    Presence of pessary #3 Ring with Support 2021     Priority: High    Cystocele with uterine descensus 2013     Priority: Medium    Pessary maintenance 2021    Osteoporosis 2013    Hypertension 2013         Plan:  1. Return to the office 10 weeks  2. Report any vaginal bleeding or discharge  3. Abstinence  4. Annual Follow-up reviewed with patient. They will schedule appointment    The patient, Ajith Sow is a 80 y.o. female, was seen with a total time spent of 20 minutes for the visit on this date of service by the E/M provider. The time component had both face to face and non face to face time spent in determining the total time component. Counseling and education regarding her diagnosis listed below and her options regarding those diagnoses were also included in determining her time component. Diagnosis Orders   1. Cystocele with uterine descensus     2. Presence of pessary #3 Ring with Support          The patient had her preventative health maintenance recommendations and follow-up reviewed with her at the completion of her visit.

## 2022-01-12 ENCOUNTER — PROCEDURE VISIT (OUTPATIENT)
Dept: OBGYN | Age: 82
End: 2022-01-12
Payer: MEDICARE

## 2022-01-12 VITALS
OXYGEN SATURATION: 96 % | BODY MASS INDEX: 25.15 KG/M2 | SYSTOLIC BLOOD PRESSURE: 120 MMHG | WEIGHT: 160.2 LBS | HEART RATE: 94 BPM | DIASTOLIC BLOOD PRESSURE: 60 MMHG | HEIGHT: 67 IN

## 2022-01-12 DIAGNOSIS — Z46.89 PESSARY MAINTENANCE: Primary | ICD-10-CM

## 2022-01-12 DIAGNOSIS — Z96.0 PRESENCE OF PESSARY: ICD-10-CM

## 2022-01-12 DIAGNOSIS — N81.4 CYSTOCELE WITH UTERINE DESCENSUS: ICD-10-CM

## 2022-01-12 PROCEDURE — 99213 OFFICE O/P EST LOW 20 MIN: CPT | Performed by: OBSTETRICS & GYNECOLOGY

## 2022-01-12 PROCEDURE — 4040F PNEUMOC VAC/ADMIN/RCVD: CPT | Performed by: OBSTETRICS & GYNECOLOGY

## 2022-01-12 PROCEDURE — G8400 PT W/DXA NO RESULTS DOC: HCPCS | Performed by: OBSTETRICS & GYNECOLOGY

## 2022-01-12 PROCEDURE — 1123F ACP DISCUSS/DSCN MKR DOCD: CPT | Performed by: OBSTETRICS & GYNECOLOGY

## 2022-01-12 PROCEDURE — 1090F PRES/ABSN URINE INCON ASSESS: CPT | Performed by: OBSTETRICS & GYNECOLOGY

## 2022-01-12 PROCEDURE — 1036F TOBACCO NON-USER: CPT | Performed by: OBSTETRICS & GYNECOLOGY

## 2022-01-12 PROCEDURE — G8484 FLU IMMUNIZE NO ADMIN: HCPCS | Performed by: OBSTETRICS & GYNECOLOGY

## 2022-01-12 PROCEDURE — G8427 DOCREV CUR MEDS BY ELIG CLIN: HCPCS | Performed by: OBSTETRICS & GYNECOLOGY

## 2022-01-12 PROCEDURE — G8417 CALC BMI ABV UP PARAM F/U: HCPCS | Performed by: OBSTETRICS & GYNECOLOGY

## 2022-01-12 RX ORDER — GABAPENTIN 100 MG/1
100 CAPSULE ORAL 2 TIMES DAILY
COMMUNITY
Start: 2021-11-09

## 2022-01-12 NOTE — PROGRESS NOTES
Estevan Carlin  2022  1:29 PM          Stephanie Russell is a 80 y.o. female       The patient was seen. She has no chief complaints today. She has been fitted for a # 3; ring with support type pessary. She states all of her symptoms that she had prior to the pessary have been relieved with its use. She denies any vaginal bleeding. She last had her pessary cleaned 10-12 weeks ago. She denies to any vaginal discharge or odor. Her bowels are  regular and her voiding pattern is normal.     Blood pressure 120/60, pulse 94, height 5' 7\" (1.702 m), weight 160 lb 3.2 oz (72.7 kg), SpO2 96 %, not currently breastfeeding. Chaperone for Intimate Exam   Chaperone was offered and accepted as part of the rooming process.  Chaperone: Andressa        Abdomen: Soft and non-tender; good bowel sounds; no guarding, rebound or rigidity; no CVA tenderness bilaterally. Extremities: No calf tenderness bilaterally. DTR 2/4 bilaterally. No edema. Perineum/Speculum: There is not any signs of infection; The vaginal vault is without any signs of erythema or erosion. There is no vaginal discharge or odor appreciated. The pessary was cleansed and replaced without any problems and the patient tolerated the procedure well. T.O.S. Ointment was placed with the pessary to decrease discharge/odor. Assessment:   Diagnosis Orders   1. Pessary maintenance     2. Presence of pessary #3 Ring with Support     3. Cystocele with uterine descensus       Chief Complaint   Patient presents with    Procedure     pessary check     Patient Active Problem List    Diagnosis Date Noted    Presence of pessary #3 Ring with Support 2021     Priority: High    Cystocele with uterine descensus 2013     Priority: Medium    Pessary maintenance 2021    Osteoporosis 2013    Hypertension 2013         Plan:  1. Return to the office 10-12 weeks  2. Report any vaginal bleeding or discharge  3. Abstinence  4. Annual Follow-up reviewed with patient. They will schedule appointment    The patient, Jannet Jimenez is a 80 y.o. female, was seen with a total time spent of 20 minutes for the visit on this date of service by the E/M provider. The time component had both face to face and non face to face time spent in determining the total time component. Counseling and education regarding her diagnosis listed below and her options regarding those diagnoses were also included in determining her time component. Diagnosis Orders   1. Pessary maintenance     2. Presence of pessary #3 Ring with Support     3. Cystocele with uterine descensus          The patient had her preventative health maintenance recommendations and follow-up reviewed with her at the completion of her visit.

## 2022-04-20 ENCOUNTER — PROCEDURE VISIT (OUTPATIENT)
Dept: OBGYN | Age: 82
End: 2022-04-20
Payer: MEDICARE

## 2022-04-20 VITALS
DIASTOLIC BLOOD PRESSURE: 64 MMHG | HEIGHT: 67 IN | WEIGHT: 161.6 LBS | BODY MASS INDEX: 25.36 KG/M2 | SYSTOLIC BLOOD PRESSURE: 120 MMHG | HEART RATE: 56 BPM | OXYGEN SATURATION: 97 %

## 2022-04-20 DIAGNOSIS — Z96.0 PRESENCE OF PESSARY: ICD-10-CM

## 2022-04-20 DIAGNOSIS — N81.4 CYSTOCELE WITH UTERINE DESCENSUS: ICD-10-CM

## 2022-04-20 DIAGNOSIS — Z46.89 PESSARY MAINTENANCE: Primary | ICD-10-CM

## 2022-04-20 PROCEDURE — 99214 OFFICE O/P EST MOD 30 MIN: CPT | Performed by: OBSTETRICS & GYNECOLOGY

## 2022-04-20 PROCEDURE — G8427 DOCREV CUR MEDS BY ELIG CLIN: HCPCS | Performed by: OBSTETRICS & GYNECOLOGY

## 2022-04-20 PROCEDURE — 1123F ACP DISCUSS/DSCN MKR DOCD: CPT | Performed by: OBSTETRICS & GYNECOLOGY

## 2022-04-20 PROCEDURE — G8400 PT W/DXA NO RESULTS DOC: HCPCS | Performed by: OBSTETRICS & GYNECOLOGY

## 2022-04-20 PROCEDURE — 99213 OFFICE O/P EST LOW 20 MIN: CPT | Performed by: OBSTETRICS & GYNECOLOGY

## 2022-04-20 PROCEDURE — 1090F PRES/ABSN URINE INCON ASSESS: CPT | Performed by: OBSTETRICS & GYNECOLOGY

## 2022-04-20 PROCEDURE — G8417 CALC BMI ABV UP PARAM F/U: HCPCS | Performed by: OBSTETRICS & GYNECOLOGY

## 2022-04-20 PROCEDURE — 4040F PNEUMOC VAC/ADMIN/RCVD: CPT | Performed by: OBSTETRICS & GYNECOLOGY

## 2022-04-20 PROCEDURE — 1036F TOBACCO NON-USER: CPT | Performed by: OBSTETRICS & GYNECOLOGY

## 2022-04-20 NOTE — PROGRESS NOTES
Camron Jorgensen  2022  1:35 PM          Camron Jorgensen is a [de-identified] y.o. female       The patient was seen. She has no chief complaints today. She has been fitted for a # 3; ring with support type pessary. She states all of her symptoms that she had prior to the pessary have been relieved with its use. She denies any vaginal bleeding. She denies to any vaginal discharge or odor. Her bowels are regular and her voiding pattern is normal.     Blood pressure 120/64, pulse 56, height 5' 6.5\" (1.689 m), weight 161 lb 9.6 oz (73.3 kg), SpO2 97 %, not currently breastfeeding. Chaperone for Intimate Exam   Chaperone was offered and accepted as part of the rooming process.  Chaperone: David Forward        Abdomen: Soft and non-tender; good bowel sounds; no guarding, rebound or rigidity; no CVA tenderness bilaterally. Extremities: No calf tenderness bilaterally. DTR 2/4 bilaterally. No edema. Perineum/Speculum: There is not any signs of infection; The vaginal vault is without any signs of erythema or erosion. There is no vaginal discharge or odor appreciated. The pessary was cleansed and replaced without any problems and the patient tolerated the procedure well. T.O.S. Ointment was placed with the pessary to decrease discharge/odor. Assessment:   Diagnosis Orders   1. Pessary maintenance     2. Presence of pessary #3 Ring with Support     3. Cystocele with uterine descensus       Chief Complaint   Patient presents with    Other     Pessary cleaning     Patient Active Problem List    Diagnosis Date Noted    Presence of pessary #3 Ring with Support 2021     Priority: High    Cystocele with uterine descensus 2013     Priority: Medium    Pessary maintenance 2021    Osteoporosis 2013    Hypertension 2013         Plan:  1. Return to the office 10-12 weeks  2. Report any vaginal bleeding or discharge  3. Abstinence  4. Annual Follow-up reviewed with patient.  They will

## 2022-08-11 ENCOUNTER — PROCEDURE VISIT (OUTPATIENT)
Dept: OBGYN | Age: 82
End: 2022-08-11
Payer: MEDICARE

## 2022-08-11 VITALS
OXYGEN SATURATION: 97 % | BODY MASS INDEX: 25.29 KG/M2 | DIASTOLIC BLOOD PRESSURE: 50 MMHG | HEIGHT: 66 IN | HEART RATE: 94 BPM | SYSTOLIC BLOOD PRESSURE: 100 MMHG | WEIGHT: 157.38 LBS

## 2022-08-11 DIAGNOSIS — Z46.89 PESSARY MAINTENANCE: Primary | ICD-10-CM

## 2022-08-11 DIAGNOSIS — Z96.0 PRESENCE OF PESSARY: ICD-10-CM

## 2022-08-11 DIAGNOSIS — N81.4 CYSTOCELE WITH UTERINE DESCENSUS: ICD-10-CM

## 2022-08-11 PROCEDURE — G8417 CALC BMI ABV UP PARAM F/U: HCPCS | Performed by: OBSTETRICS & GYNECOLOGY

## 2022-08-11 PROCEDURE — G8427 DOCREV CUR MEDS BY ELIG CLIN: HCPCS | Performed by: OBSTETRICS & GYNECOLOGY

## 2022-08-11 PROCEDURE — 1036F TOBACCO NON-USER: CPT | Performed by: OBSTETRICS & GYNECOLOGY

## 2022-08-11 PROCEDURE — 1090F PRES/ABSN URINE INCON ASSESS: CPT | Performed by: OBSTETRICS & GYNECOLOGY

## 2022-08-11 PROCEDURE — 99213 OFFICE O/P EST LOW 20 MIN: CPT | Performed by: OBSTETRICS & GYNECOLOGY

## 2022-08-11 PROCEDURE — G8400 PT W/DXA NO RESULTS DOC: HCPCS | Performed by: OBSTETRICS & GYNECOLOGY

## 2022-08-11 PROCEDURE — 1123F ACP DISCUSS/DSCN MKR DOCD: CPT | Performed by: OBSTETRICS & GYNECOLOGY

## 2022-08-11 NOTE — PROGRESS NOTES
Ramona Tao Raegan  2022  12:07 PM          Enrique Alvarez is a [de-identified] y.o. female       The patient was seen. She has no chief complaints today. She has been fitted for a # 3; ring with support type pessary. She states all of her symptoms that she had prior to the pessary have been relieved with its use. She denies any vaginal bleeding. She denies to any vaginal discharge or odor. Her bowels are regular and her voiding pattern is normal.     Blood pressure (!) 100/50, pulse 94, height 5' 6\" (1.676 m), weight 157 lb 6 oz (71.4 kg), SpO2 97 %, not currently breastfeeding. Chaperone for Intimate Exam  Chaperone was offered and accepted as part of the rooming process. Chaperone: Eugenia        Abdomen: Soft and non-tender; good bowel sounds; no guarding, rebound or rigidity; no CVA tenderness bilaterally. Extremities: No calf tenderness bilaterally. DTR 2/4 bilaterally. No edema. Perineum/Speculum: There is not any signs of infection; The vaginal vault is without any signs of erythema or erosion. There is no vaginal discharge or odor appreciated. The pessary was cleansed and replaced without any problems and the patient tolerated the procedure well. T.O.S. Ointment was placed with the pessary to decrease discharge/odor. Assessment:   Diagnosis Orders   1. Pessary maintenance        2. Presence of pessary #3 Ring with Support        3. Cystocele with uterine descensus          Chief Complaint   Patient presents with    Procedure     Pessary cleaning     Patient Active Problem List    Diagnosis Date Noted    Presence of pessary #3 Ring with Support 2021     Priority: High    Cystocele with uterine descensus 2013     Priority: Medium    Pessary maintenance 2021    Osteoporosis 2013    Hypertension 2013         Plan:  1. Return to the office 10-12 weeks  2. Report any vaginal bleeding or discharge  3. Abstinence  4. Annual Follow-up reviewed with patient.  They will schedule appointment    The patient, Jv Castillo is a [de-identified] y.o. female, was seen with a total time spent of 20 minutes for the visit on this date of service by the E/M provider. The time component had both face to face and non face to face time spent in determining the total time component. Counseling and education regarding her diagnosis listed below and her options regarding those diagnoses were also included in determining her time component. Diagnosis Orders   1. Pessary maintenance        2. Presence of pessary #3 Ring with Support        3. Cystocele with uterine descensus             The patient had her preventative health maintenance recommendations and follow-up reviewed with her at the completion of her visit.

## 2022-10-06 ENCOUNTER — TELEPHONE (OUTPATIENT)
Dept: OBGYN | Age: 82
End: 2022-10-06

## 2022-11-30 ENCOUNTER — PROCEDURE VISIT (OUTPATIENT)
Dept: OBGYN | Age: 82
End: 2022-11-30
Payer: MEDICARE

## 2022-11-30 VITALS
HEART RATE: 62 BPM | BODY MASS INDEX: 25.39 KG/M2 | WEIGHT: 158 LBS | OXYGEN SATURATION: 98 % | SYSTOLIC BLOOD PRESSURE: 118 MMHG | DIASTOLIC BLOOD PRESSURE: 80 MMHG | RESPIRATION RATE: 16 BRPM | HEIGHT: 66 IN

## 2022-11-30 DIAGNOSIS — Z96.0 PRESENCE OF PESSARY: ICD-10-CM

## 2022-11-30 DIAGNOSIS — Z46.89 ENCOUNTER FOR PESSARY MAINTENANCE: Primary | ICD-10-CM

## 2022-11-30 DIAGNOSIS — N81.4 CYSTOCELE WITH UTERINE DESCENSUS: ICD-10-CM

## 2022-11-30 PROCEDURE — 3074F SYST BP LT 130 MM HG: CPT | Performed by: OBSTETRICS & GYNECOLOGY

## 2022-11-30 PROCEDURE — 1036F TOBACCO NON-USER: CPT | Performed by: OBSTETRICS & GYNECOLOGY

## 2022-11-30 PROCEDURE — 3078F DIAST BP <80 MM HG: CPT | Performed by: OBSTETRICS & GYNECOLOGY

## 2022-11-30 PROCEDURE — G8417 CALC BMI ABV UP PARAM F/U: HCPCS | Performed by: OBSTETRICS & GYNECOLOGY

## 2022-11-30 PROCEDURE — 99212 OFFICE O/P EST SF 10 MIN: CPT

## 2022-11-30 PROCEDURE — G8400 PT W/DXA NO RESULTS DOC: HCPCS | Performed by: OBSTETRICS & GYNECOLOGY

## 2022-11-30 PROCEDURE — 1090F PRES/ABSN URINE INCON ASSESS: CPT | Performed by: OBSTETRICS & GYNECOLOGY

## 2022-11-30 PROCEDURE — G8427 DOCREV CUR MEDS BY ELIG CLIN: HCPCS | Performed by: OBSTETRICS & GYNECOLOGY

## 2022-11-30 PROCEDURE — 1123F ACP DISCUSS/DSCN MKR DOCD: CPT | Performed by: OBSTETRICS & GYNECOLOGY

## 2022-11-30 PROCEDURE — G8484 FLU IMMUNIZE NO ADMIN: HCPCS | Performed by: OBSTETRICS & GYNECOLOGY

## 2022-11-30 PROCEDURE — 99213 OFFICE O/P EST LOW 20 MIN: CPT | Performed by: OBSTETRICS & GYNECOLOGY

## 2022-11-30 NOTE — PROGRESS NOTES
Cheikh Carlin  11/30/2022  1:16 PM          Kathy Powers is a [de-identified] y.o. female [id number]      The patient was seen. She has no chief complaints today. She has been fitted for a # 3; ring with support type pessary. She states all of her symptoms that she had prior to the pessary have been relieved with its use. She denies any vaginal bleeding. She denies to any vaginal discharge or odor. Her bowels are regular and her voiding pattern is normal.     Resp. rate 16, height 5' 6\" (1.676 m), weight 158 lb (71.7 kg), not currently breastfeeding. Chaperone for Intimate Exam  Chaperone was offered and accepted as part of the rooming process. Chaperone: April        Abdomen: Soft and non-tender; good bowel sounds; no guarding, rebound or rigidity; no CVA tenderness bilaterally. Extremities: No calf tenderness bilaterally. DTR 2/4 bilaterally. No edema. Perineum/Speculum: There is not any signs of infection; The vaginal vault is without any signs of erythema or erosion. There is no vaginal discharge or odor appreciated. The pessary was cleansed and replaced without any problems and the patient tolerated the procedure well. T.O.S. Ointment was placed with the pessary to decrease discharge/odor. Assessment:   Diagnosis Orders   1. Encounter for pessary maintenance        2. Presence of pessary #3 Ring with Support        3. Cystocele with uterine descensus          Chief Complaint   Patient presents with    Procedure     Pessary check      Vaginal Bleeding     About 2 weeks ago had a day where there was some spotting that was present for a few hours. Patient Active Problem List    Diagnosis Date Noted    Presence of pessary #3 Ring with Support 07/07/2021     Priority: High    Cystocele with uterine descensus 07/24/2013     Priority: Medium    Pessary maintenance 01/27/2021    Osteoporosis 07/24/2013    Hypertension 07/24/2013         Plan:  1. Return to the office 8-10 weeks  2.  Report any vaginal bleeding or discharge  3. Abstinence  4. Annual Follow-up reviewed with patient. They will schedule appointment    The patient, Geannie Goodell is a [de-identified] y.o. female, was seen with a total time spent of 20 minutes for the visit on this date of service by the E/M provider. The time component had both face to face and non face to face time spent in determining the total time component. Counseling and education regarding her diagnosis listed below and her options regarding those diagnoses were also included in determining her time component. Diagnosis Orders   1. Encounter for pessary maintenance        2. Presence of pessary #3 Ring with Support        3. Cystocele with uterine descensus             The patient had her preventative health maintenance recommendations and follow-up reviewed with her at the completion of her visit.

## 2023-02-15 ENCOUNTER — PROCEDURE VISIT (OUTPATIENT)
Dept: OBGYN | Age: 83
End: 2023-02-15
Payer: MEDICARE

## 2023-02-15 VITALS
DIASTOLIC BLOOD PRESSURE: 70 MMHG | SYSTOLIC BLOOD PRESSURE: 128 MMHG | HEIGHT: 68 IN | WEIGHT: 162 LBS | BODY MASS INDEX: 24.55 KG/M2

## 2023-02-15 DIAGNOSIS — Z96.0 PRESENCE OF PESSARY: ICD-10-CM

## 2023-02-15 DIAGNOSIS — N81.4 CYSTOCELE WITH UTERINE DESCENSUS: ICD-10-CM

## 2023-02-15 DIAGNOSIS — Z46.89 ENCOUNTER FOR PESSARY MAINTENANCE: Primary | ICD-10-CM

## 2023-02-15 PROCEDURE — 99212 OFFICE O/P EST SF 10 MIN: CPT | Performed by: OBSTETRICS & GYNECOLOGY

## 2023-02-15 NOTE — PROGRESS NOTES
Nalini Brooks  2/15/2023  11:21 AM          Nalini Brooks is a [de-identified] y.o. female       The patient was seen. She has no chief complaints today. She has been fitted for a # 3; ring with support type pessary. She states all of her symptoms that she had prior to the pessary have been relieved with its use. She denies any vaginal bleeding. She denies to any vaginal discharge or odor. Her bowels are regular and her voiding pattern is normal.     Blood pressure 128/70, height 5' 8\" (1.727 m), weight 162 lb (73.5 kg), not currently breastfeeding. Chaperone for Intimate Exam  Chaperone was offered and accepted as part of the rooming process. Chaperone: JUSTIN        Abdomen: Soft and non-tender; good bowel sounds; no guarding, rebound or rigidity; no CVA tenderness bilaterally. Extremities: No calf tenderness bilaterally. DTR 2/4 bilaterally. No edema. Perineum/Speculum: There is not any signs of infection; The vaginal vault is without any signs of erythema or erosion. There is no vaginal discharge or odor appreciated. The pessary was cleansed and replaced without any problems and the patient tolerated the procedure well. T.O.S. Ointment was placed with the pessary to decrease discharge/odor. Assessment:   Diagnosis Orders   1. Encounter for pessary maintenance        2. Presence of pessary #3 Ring with Support        3. Cystocele with uterine descensus          Chief Complaint   Patient presents with    Other     Pessary maintenance. Patient Active Problem List    Diagnosis Date Noted    Presence of pessary #3 Ring with Support 2021     Priority: High    Cystocele with uterine descensus 2013     Priority: Medium    Pessary maintenance 2021    Osteoporosis 2013    Hypertension 2013         Plan:  1. Return to the office 12-16 weeks  2. Report any vaginal bleeding or discharge  3. Abstinence  4. Annual Follow-up reviewed with patient.  They will schedule appointment    The patient, Alecia Cancer is a [de-identified] y.o. female, was seen with a total time spent of 20 minutes for the visit on this date of service by the E/M provider. The time component had both face to face and non face to face time spent in determining the total time component. Counseling and education regarding her diagnosis listed below and her options regarding those diagnoses were also included in determining her time component. Diagnosis Orders   1. Encounter for pessary maintenance        2. Presence of pessary #3 Ring with Support        3. Cystocele with uterine descensus             The patient had her preventative health maintenance recommendations and follow-up reviewed with her at the completion of her visit.

## 2023-06-21 ENCOUNTER — PROCEDURE VISIT (OUTPATIENT)
Dept: OBGYN | Age: 83
End: 2023-06-21
Payer: MEDICARE

## 2023-06-21 VITALS
HEART RATE: 56 BPM | DIASTOLIC BLOOD PRESSURE: 60 MMHG | WEIGHT: 161.6 LBS | OXYGEN SATURATION: 96 % | SYSTOLIC BLOOD PRESSURE: 104 MMHG | HEIGHT: 67 IN | BODY MASS INDEX: 25.36 KG/M2

## 2023-06-21 DIAGNOSIS — N81.4 CYSTOCELE WITH UTERINE DESCENSUS: ICD-10-CM

## 2023-06-21 DIAGNOSIS — Z46.89 PESSARY MAINTENANCE: ICD-10-CM

## 2023-06-21 DIAGNOSIS — Z46.89 ENCOUNTER FOR PESSARY MAINTENANCE: Primary | ICD-10-CM

## 2023-06-21 PROCEDURE — 99213 OFFICE O/P EST LOW 20 MIN: CPT | Performed by: OBSTETRICS & GYNECOLOGY

## 2023-06-21 NOTE — PROGRESS NOTES
Corinne Olmstead  2023  12:35 PM          Corinne Olmstead is a 82 y.o. female       The patient was seen. She has no chief complaints today. She has been fitted for a # 3; ring with support type pessary. She states all of her symptoms that she had prior to the pessary have been relieved with its use. She denies any vaginal bleeding. She denies to any vaginal discharge or odor. Her bowels are regular and her voiding pattern is normal.     Blood pressure 104/60, pulse 56, height 5' 7\" (1.702 m), weight 161 lb 9.6 oz (73.3 kg), SpO2 96 %, not currently breastfeeding. Chaperone for Intimate Exam  Chaperone was offered and accepted as part of the rooming process. Chaperone: April        Abdomen: Soft and non-tender; good bowel sounds; no guarding, rebound or rigidity; no CVA tenderness bilaterally. Extremities: No calf tenderness bilaterally. DTR 2/4 bilaterally. No edema. Perineum/Speculum: There is not any signs of infection; The vaginal vault is without any signs of erythema or erosion. There is no vaginal discharge or odor appreciated. The pessary was cleansed and replaced without any problems and the patient tolerated the procedure well. T.O.S. Ointment was placed with the pessary to decrease discharge/odor. Assessment:   Diagnosis Orders   1. Encounter for pessary maintenance        2. Cystocele with uterine descensus        3. Pessary maintenance          Chief Complaint   Patient presents with    Other     Pessary maintenance. Patient Active Problem List    Diagnosis Date Noted    Presence of pessary #3 Ring with Support 2021     Priority: High    Cystocele with uterine descensus 2013     Priority: Medium    Pessary maintenance 2021    Osteoporosis 2013    Hypertension 2013         Plan:  1. Return to the office 12-16 weeks at pt request. Last cleaning pessary was very clean  2. Report any vaginal bleeding or discharge  3. Abstinence  4.  Annual

## 2023-10-25 ENCOUNTER — OFFICE VISIT (OUTPATIENT)
Dept: OBGYN | Age: 83
End: 2023-10-25
Payer: MEDICARE

## 2023-10-25 VITALS
HEART RATE: 78 BPM | OXYGEN SATURATION: 98 % | HEIGHT: 67 IN | RESPIRATION RATE: 18 BRPM | BODY MASS INDEX: 25.9 KG/M2 | SYSTOLIC BLOOD PRESSURE: 108 MMHG | DIASTOLIC BLOOD PRESSURE: 62 MMHG | WEIGHT: 165 LBS

## 2023-10-25 DIAGNOSIS — Z96.0 PRESENCE OF PESSARY: ICD-10-CM

## 2023-10-25 DIAGNOSIS — N81.4 CYSTOCELE WITH UTERINE DESCENSUS: ICD-10-CM

## 2023-10-25 DIAGNOSIS — Z46.89 ENCOUNTER FOR PESSARY MAINTENANCE: Primary | ICD-10-CM

## 2023-10-25 PROCEDURE — 99213 OFFICE O/P EST LOW 20 MIN: CPT | Performed by: OBSTETRICS & GYNECOLOGY

## 2023-10-25 PROCEDURE — 1036F TOBACCO NON-USER: CPT | Performed by: OBSTETRICS & GYNECOLOGY

## 2023-10-25 PROCEDURE — G8417 CALC BMI ABV UP PARAM F/U: HCPCS | Performed by: OBSTETRICS & GYNECOLOGY

## 2023-10-25 PROCEDURE — 1123F ACP DISCUSS/DSCN MKR DOCD: CPT | Performed by: OBSTETRICS & GYNECOLOGY

## 2023-10-25 PROCEDURE — 99214 OFFICE O/P EST MOD 30 MIN: CPT | Performed by: OBSTETRICS & GYNECOLOGY

## 2023-10-25 PROCEDURE — 3074F SYST BP LT 130 MM HG: CPT | Performed by: OBSTETRICS & GYNECOLOGY

## 2023-10-25 PROCEDURE — G8400 PT W/DXA NO RESULTS DOC: HCPCS | Performed by: OBSTETRICS & GYNECOLOGY

## 2023-10-25 PROCEDURE — G8427 DOCREV CUR MEDS BY ELIG CLIN: HCPCS | Performed by: OBSTETRICS & GYNECOLOGY

## 2023-10-25 PROCEDURE — G8484 FLU IMMUNIZE NO ADMIN: HCPCS | Performed by: OBSTETRICS & GYNECOLOGY

## 2023-10-25 PROCEDURE — 1090F PRES/ABSN URINE INCON ASSESS: CPT | Performed by: OBSTETRICS & GYNECOLOGY

## 2023-10-25 PROCEDURE — 3078F DIAST BP <80 MM HG: CPT | Performed by: OBSTETRICS & GYNECOLOGY

## 2023-10-25 RX ORDER — MULTIVIT WITH MINERALS/LUTEIN
250 TABLET ORAL DAILY
COMMUNITY

## 2023-10-25 RX ORDER — MULTIVIT-MIN/IRON/FOLIC ACID/K 18-600-40
CAPSULE ORAL
COMMUNITY

## 2023-10-25 SDOH — ECONOMIC STABILITY: INCOME INSECURITY: HOW HARD IS IT FOR YOU TO PAY FOR THE VERY BASICS LIKE FOOD, HOUSING, MEDICAL CARE, AND HEATING?: NOT HARD AT ALL

## 2023-10-25 SDOH — ECONOMIC STABILITY: HOUSING INSECURITY
IN THE LAST 12 MONTHS, WAS THERE A TIME WHEN YOU DID NOT HAVE A STEADY PLACE TO SLEEP OR SLEPT IN A SHELTER (INCLUDING NOW)?: NO

## 2023-10-25 SDOH — ECONOMIC STABILITY: FOOD INSECURITY: WITHIN THE PAST 12 MONTHS, YOU WORRIED THAT YOUR FOOD WOULD RUN OUT BEFORE YOU GOT MONEY TO BUY MORE.: NEVER TRUE

## 2023-10-25 SDOH — ECONOMIC STABILITY: FOOD INSECURITY: WITHIN THE PAST 12 MONTHS, THE FOOD YOU BOUGHT JUST DIDN'T LAST AND YOU DIDN'T HAVE MONEY TO GET MORE.: NEVER TRUE

## 2023-10-25 ASSESSMENT — PATIENT HEALTH QUESTIONNAIRE - PHQ9
SUM OF ALL RESPONSES TO PHQ QUESTIONS 1-9: 0
SUM OF ALL RESPONSES TO PHQ QUESTIONS 1-9: 0
SUM OF ALL RESPONSES TO PHQ9 QUESTIONS 1 & 2: 0
SUM OF ALL RESPONSES TO PHQ QUESTIONS 1-9: 0
2. FEELING DOWN, DEPRESSED OR HOPELESS: 0
1. LITTLE INTEREST OR PLEASURE IN DOING THINGS: 0
SUM OF ALL RESPONSES TO PHQ QUESTIONS 1-9: 0

## 2023-10-25 NOTE — PROGRESS NOTES
Parminder Fioremallika  10/25/2023  1:09 PM          Boogie Li is a 82 y.o. female       The patient was seen. She has no chief complaints today. She has been fitted for a # 3; ring with support type pessary. She states all of her symptoms that she had prior to the pessary have been relieved with its use. She denies any vaginal bleeding. She denies to any vaginal discharge or odor. Her bowels are regular and her voiding pattern is normal.     Blood pressure 108/62, pulse 78, resp. rate 18, height 1.702 m (5' 7\"), weight 74.8 kg (165 lb), SpO2 98 %, not currently breastfeeding. Chaperone for Intimate Exam  Chaperone was offered and accepted as part of the rooming process. Chaperone: Kathryn        Abdomen: Soft and non-tender; good bowel sounds; no guarding, rebound or rigidity; no CVA tenderness bilaterally. Extremities: No calf tenderness bilaterally. DTR 2/4 bilaterally. No edema. Perineum/Speculum: There is not any signs of infection; The vaginal vault is without any signs of erythema or erosion. There is no vaginal discharge or odor appreciated. The pessary was cleansed and replaced without any problems and the patient tolerated the procedure well. T.O.S. Ointment was placed with the pessary to decrease discharge/odor. Assessment:   Diagnosis Orders   1. Encounter for pessary maintenance        2. Cystocele with uterine descensus        3. Presence of pessary #3 Ring with Support          Chief Complaint   Patient presents with    Follow-up     pessary     Patient Active Problem List    Diagnosis Date Noted    Presence of pessary #3 Ring with Support 2021     Priority: High    Cystocele with uterine descensus 2013     Priority: Medium    Pessary maintenance 2021    Osteoporosis 2013    Hypertension 2013         Plan:  1. Return to the office 10-12 weeks  2. Report any vaginal bleeding or discharge  3. Abstinence  4. Annual Follow-up reviewed with patient.

## 2024-01-25 ENCOUNTER — OFFICE VISIT (OUTPATIENT)
Dept: OBGYN | Age: 84
End: 2024-01-25
Payer: MEDICARE

## 2024-01-25 VITALS
HEART RATE: 55 BPM | BODY MASS INDEX: 26.21 KG/M2 | WEIGHT: 167 LBS | HEIGHT: 67 IN | RESPIRATION RATE: 16 BRPM | DIASTOLIC BLOOD PRESSURE: 66 MMHG | SYSTOLIC BLOOD PRESSURE: 112 MMHG

## 2024-01-25 DIAGNOSIS — Z46.89 ENCOUNTER FOR PESSARY MAINTENANCE: Primary | ICD-10-CM

## 2024-01-25 DIAGNOSIS — Z96.0 PRESENCE OF PESSARY: ICD-10-CM

## 2024-01-25 DIAGNOSIS — N81.4 CYSTOCELE WITH UTERINE DESCENSUS: ICD-10-CM

## 2024-01-25 PROCEDURE — G8400 PT W/DXA NO RESULTS DOC: HCPCS | Performed by: OBSTETRICS & GYNECOLOGY

## 2024-01-25 PROCEDURE — 3074F SYST BP LT 130 MM HG: CPT | Performed by: OBSTETRICS & GYNECOLOGY

## 2024-01-25 PROCEDURE — 3078F DIAST BP <80 MM HG: CPT | Performed by: OBSTETRICS & GYNECOLOGY

## 2024-01-25 PROCEDURE — 99213 OFFICE O/P EST LOW 20 MIN: CPT | Performed by: OBSTETRICS & GYNECOLOGY

## 2024-01-25 PROCEDURE — 1090F PRES/ABSN URINE INCON ASSESS: CPT | Performed by: OBSTETRICS & GYNECOLOGY

## 2024-01-25 PROCEDURE — G8417 CALC BMI ABV UP PARAM F/U: HCPCS | Performed by: OBSTETRICS & GYNECOLOGY

## 2024-01-25 PROCEDURE — 1036F TOBACCO NON-USER: CPT | Performed by: OBSTETRICS & GYNECOLOGY

## 2024-01-25 PROCEDURE — G8427 DOCREV CUR MEDS BY ELIG CLIN: HCPCS | Performed by: OBSTETRICS & GYNECOLOGY

## 2024-01-25 PROCEDURE — 1123F ACP DISCUSS/DSCN MKR DOCD: CPT | Performed by: OBSTETRICS & GYNECOLOGY

## 2024-01-25 PROCEDURE — G8484 FLU IMMUNIZE NO ADMIN: HCPCS | Performed by: OBSTETRICS & GYNECOLOGY

## 2024-01-25 RX ORDER — MECLIZINE HYDROCHLORIDE 25 MG/1
25 TABLET ORAL 3 TIMES DAILY PRN
COMMUNITY

## 2024-01-25 RX ORDER — HYDRALAZINE HYDROCHLORIDE 25 MG/1
25 TABLET, FILM COATED ORAL 3 TIMES DAILY
COMMUNITY

## 2024-01-25 ASSESSMENT — PATIENT HEALTH QUESTIONNAIRE - PHQ9
SUM OF ALL RESPONSES TO PHQ QUESTIONS 1-9: 0
2. FEELING DOWN, DEPRESSED OR HOPELESS: 0
SUM OF ALL RESPONSES TO PHQ9 QUESTIONS 1 & 2: 0
1. LITTLE INTEREST OR PLEASURE IN DOING THINGS: 0
SUM OF ALL RESPONSES TO PHQ QUESTIONS 1-9: 0

## 2024-01-25 NOTE — PROGRESS NOTES
Britney Carlin  2024  12:05 PM          Britney Carlin is a 83 y.o. female       The patient was seen. She has no chief complaints today. She has been fitted for a # 3; ring with support type pessary.  She states all of her symptoms that she had prior to the pessary have been relieved with its use.  She denies any vaginal bleeding.  She denies to any vaginal discharge or odor. Her bowels are regular and her voiding pattern is normal.     Blood pressure 112/66, pulse 55, resp. rate 16, height 1.702 m (5' 7\"), weight 75.8 kg (167 lb), not currently breastfeeding.    Chaperone for Intimate Exam  Chaperone was offered and accepted as part of the rooming process.  Chaperone: April        Abdomen: Soft and non-tender; good bowel sounds; no guarding, rebound or rigidity; no CVA tenderness bilaterally.    Extremities: No calf tenderness bilaterally. DTR 2/4 bilaterally. No edema.    Perineum/Speculum: There is not any signs of infection; The vaginal vault is without any signs of erythema or erosion. There is no vaginal discharge or odor appreciated.    The pessary was cleansed and replaced without any problems and the patient tolerated the procedure well. T.O.S. Ointment was placed with the pessary to decrease discharge/odor.    Assessment:   Diagnosis Orders   1. Encounter for pessary maintenance        2. Cystocele with uterine descensus        3. Presence of pessary #3 Ring with Support          Chief Complaint   Patient presents with    Other     Pessary cleaning     Patient Active Problem List    Diagnosis Date Noted    Presence of pessary #3 Ring with Support 2021     Priority: High    Cystocele with uterine descensus 2013     Priority: Medium    Pessary maintenance 2021    Osteoporosis 2013    Hypertension 2013         Plan:  1. Return to the office 10-12 weeks  2. Report any vaginal bleeding or discharge  3. Abstinence  4. Annual Follow-up reviewed with patient. They

## 2024-04-25 ENCOUNTER — OFFICE VISIT (OUTPATIENT)
Dept: OBGYN | Age: 84
End: 2024-04-25
Payer: MEDICARE

## 2024-04-25 VITALS
DIASTOLIC BLOOD PRESSURE: 64 MMHG | SYSTOLIC BLOOD PRESSURE: 128 MMHG | WEIGHT: 166 LBS | BODY MASS INDEX: 26.06 KG/M2 | RESPIRATION RATE: 18 BRPM | HEART RATE: 60 BPM | HEIGHT: 67 IN

## 2024-04-25 DIAGNOSIS — Z46.89 ENCOUNTER FOR PESSARY MAINTENANCE: Primary | ICD-10-CM

## 2024-04-25 DIAGNOSIS — N81.4 CYSTOCELE WITH UTERINE DESCENSUS: ICD-10-CM

## 2024-04-25 DIAGNOSIS — Z96.0 PRESENCE OF PESSARY: ICD-10-CM

## 2024-04-25 PROCEDURE — 1090F PRES/ABSN URINE INCON ASSESS: CPT | Performed by: OBSTETRICS & GYNECOLOGY

## 2024-04-25 PROCEDURE — 3074F SYST BP LT 130 MM HG: CPT | Performed by: OBSTETRICS & GYNECOLOGY

## 2024-04-25 PROCEDURE — 1036F TOBACCO NON-USER: CPT | Performed by: OBSTETRICS & GYNECOLOGY

## 2024-04-25 PROCEDURE — 3078F DIAST BP <80 MM HG: CPT | Performed by: OBSTETRICS & GYNECOLOGY

## 2024-04-25 PROCEDURE — 1123F ACP DISCUSS/DSCN MKR DOCD: CPT | Performed by: OBSTETRICS & GYNECOLOGY

## 2024-04-25 PROCEDURE — 99213 OFFICE O/P EST LOW 20 MIN: CPT | Performed by: OBSTETRICS & GYNECOLOGY

## 2024-04-25 PROCEDURE — 99214 OFFICE O/P EST MOD 30 MIN: CPT

## 2024-04-25 PROCEDURE — G8417 CALC BMI ABV UP PARAM F/U: HCPCS | Performed by: OBSTETRICS & GYNECOLOGY

## 2024-04-25 PROCEDURE — G8400 PT W/DXA NO RESULTS DOC: HCPCS | Performed by: OBSTETRICS & GYNECOLOGY

## 2024-04-25 PROCEDURE — G8427 DOCREV CUR MEDS BY ELIG CLIN: HCPCS | Performed by: OBSTETRICS & GYNECOLOGY

## 2024-04-25 NOTE — PROGRESS NOTES
schedule appointment    The patient, Britney Carlin is a 84 y.o. female, was seen with a total time spent of 20 minutes for the visit on this date of service by the E/M provider. The time component had both face to face and non face to face time spent in determining the total time component.  Counseling and education regarding her diagnosis listed below and her options regarding those diagnoses were also included in determining her time component.      Diagnosis Orders   1. Encounter for pessary maintenance        2. Cystocele with uterine descensus        3. Presence of pessary #3 Ring with Support             The patient had her preventative health maintenance recommendations and follow-up reviewed with her at the completion of her visit.

## 2024-08-29 ENCOUNTER — OFFICE VISIT (OUTPATIENT)
Dept: OBGYN | Age: 84
End: 2024-08-29
Payer: MEDICARE

## 2024-08-29 VITALS
RESPIRATION RATE: 16 BRPM | DIASTOLIC BLOOD PRESSURE: 60 MMHG | BODY MASS INDEX: 25.74 KG/M2 | SYSTOLIC BLOOD PRESSURE: 120 MMHG | HEART RATE: 60 BPM | HEIGHT: 67 IN | WEIGHT: 164 LBS

## 2024-08-29 DIAGNOSIS — Z96.0 PRESENCE OF PESSARY: ICD-10-CM

## 2024-08-29 DIAGNOSIS — Z46.89 ENCOUNTER FOR PESSARY MAINTENANCE: Primary | ICD-10-CM

## 2024-08-29 DIAGNOSIS — N81.4 CYSTOCELE WITH UTERINE DESCENSUS: ICD-10-CM

## 2024-08-29 PROCEDURE — 1123F ACP DISCUSS/DSCN MKR DOCD: CPT | Performed by: OBSTETRICS & GYNECOLOGY

## 2024-08-29 PROCEDURE — 1036F TOBACCO NON-USER: CPT | Performed by: OBSTETRICS & GYNECOLOGY

## 2024-08-29 PROCEDURE — 99213 OFFICE O/P EST LOW 20 MIN: CPT | Performed by: OBSTETRICS & GYNECOLOGY

## 2024-08-29 PROCEDURE — 3078F DIAST BP <80 MM HG: CPT | Performed by: OBSTETRICS & GYNECOLOGY

## 2024-08-29 PROCEDURE — 3074F SYST BP LT 130 MM HG: CPT | Performed by: OBSTETRICS & GYNECOLOGY

## 2024-08-29 PROCEDURE — G8427 DOCREV CUR MEDS BY ELIG CLIN: HCPCS | Performed by: OBSTETRICS & GYNECOLOGY

## 2024-08-29 PROCEDURE — 99213 OFFICE O/P EST LOW 20 MIN: CPT

## 2024-08-29 PROCEDURE — G8400 PT W/DXA NO RESULTS DOC: HCPCS | Performed by: OBSTETRICS & GYNECOLOGY

## 2024-08-29 PROCEDURE — G8417 CALC BMI ABV UP PARAM F/U: HCPCS | Performed by: OBSTETRICS & GYNECOLOGY

## 2024-08-29 PROCEDURE — 1090F PRES/ABSN URINE INCON ASSESS: CPT | Performed by: OBSTETRICS & GYNECOLOGY

## 2024-08-29 NOTE — PROGRESS NOTES
Britney Carlin  2024  11:35 AM          Britney Carlin is a 84 y.o. female       The patient was seen. She has no chief complaints today. She has been fitted for a # 3; ring with support type pessary.  She states all of her symptoms that she had prior to the pessary have been relieved with its use.  She denies any vaginal bleeding.  She denies to any vaginal discharge or odor. Her bowels are regular and her voiding pattern is normal.     Blood pressure 120/60, pulse 60, resp. rate 16, height 1.702 m (5' 7\"), weight 74.4 kg (164 lb), not currently breastfeeding.    Chaperone for Intimate Exam  Chaperone was offered and accepted as part of the rooming process.  Chaperone: Kathryn        Abdomen: Soft and non-tender; good bowel sounds; no guarding, rebound or rigidity; no CVA tenderness bilaterally.    Extremities: No calf tenderness bilaterally. DTR 2/4 bilaterally. No edema.    Perineum/Speculum: There is not any signs of infection; The vaginal vault is without any signs of erythema or erosion. There is no vaginal discharge or odor appreciated.    The pessary was cleansed and replaced without any problems and the patient tolerated the procedure well. T.O.S. Ointment was placed with the pessary to decrease discharge/odor.    Assessment:   Diagnosis Orders   1. Encounter for pessary maintenance        2. Cystocele with uterine descensus        3. Presence of pessary #3 Ring with Support          Chief Complaint   Patient presents with    Other     Pessary maintenance     Patient Active Problem List    Diagnosis Date Noted    Presence of pessary #3 Ring with Support 2021     Priority: High    Cystocele with uterine descensus 2013     Priority: Medium    Pessary maintenance 2021    Osteoporosis 2013    Hypertension 2013         Plan:  1. Return to the office 10-12 weeks  2. Report any vaginal bleeding or discharge  3. Abstinence  4. Annual Follow-up reviewed with patient.  They will schedule appointment    The patient, Britney Carlin is a 84 y.o. female, was seen with a total time spent of 20 minutes for the visit on this date of service by the E/M provider. The time component had both face to face and non face to face time spent in determining the total time component.  Counseling and education regarding her diagnosis listed below and her options regarding those diagnoses were also included in determining her time component.      Diagnosis Orders   1. Encounter for pessary maintenance        2. Cystocele with uterine descensus        3. Presence of pessary #3 Ring with Support             The patient had her preventative health maintenance recommendations and follow-up reviewed with her at the completion of her visit.

## 2024-11-14 ENCOUNTER — OFFICE VISIT (OUTPATIENT)
Dept: OBGYN | Age: 84
End: 2024-11-14
Payer: MEDICARE

## 2024-11-14 VITALS
SYSTOLIC BLOOD PRESSURE: 112 MMHG | RESPIRATION RATE: 16 BRPM | OXYGEN SATURATION: 98 % | WEIGHT: 164 LBS | DIASTOLIC BLOOD PRESSURE: 62 MMHG | HEART RATE: 68 BPM | BODY MASS INDEX: 25.74 KG/M2 | HEIGHT: 67 IN

## 2024-11-14 DIAGNOSIS — Z46.89 ENCOUNTER FOR PESSARY MAINTENANCE: Primary | ICD-10-CM

## 2024-11-14 DIAGNOSIS — Z46.89 PESSARY MAINTENANCE: ICD-10-CM

## 2024-11-14 DIAGNOSIS — Z96.0 PRESENCE OF PESSARY: ICD-10-CM

## 2024-11-14 PROCEDURE — 1159F MED LIST DOCD IN RCRD: CPT | Performed by: OBSTETRICS & GYNECOLOGY

## 2024-11-14 PROCEDURE — 1123F ACP DISCUSS/DSCN MKR DOCD: CPT | Performed by: OBSTETRICS & GYNECOLOGY

## 2024-11-14 PROCEDURE — 3078F DIAST BP <80 MM HG: CPT | Performed by: OBSTETRICS & GYNECOLOGY

## 2024-11-14 PROCEDURE — 99213 OFFICE O/P EST LOW 20 MIN: CPT | Performed by: OBSTETRICS & GYNECOLOGY

## 2024-11-14 PROCEDURE — G8400 PT W/DXA NO RESULTS DOC: HCPCS | Performed by: OBSTETRICS & GYNECOLOGY

## 2024-11-14 PROCEDURE — G8417 CALC BMI ABV UP PARAM F/U: HCPCS | Performed by: OBSTETRICS & GYNECOLOGY

## 2024-11-14 PROCEDURE — 1090F PRES/ABSN URINE INCON ASSESS: CPT | Performed by: OBSTETRICS & GYNECOLOGY

## 2024-11-14 PROCEDURE — G8484 FLU IMMUNIZE NO ADMIN: HCPCS | Performed by: OBSTETRICS & GYNECOLOGY

## 2024-11-14 PROCEDURE — 1036F TOBACCO NON-USER: CPT | Performed by: OBSTETRICS & GYNECOLOGY

## 2024-11-14 PROCEDURE — 3074F SYST BP LT 130 MM HG: CPT | Performed by: OBSTETRICS & GYNECOLOGY

## 2024-11-14 PROCEDURE — G8427 DOCREV CUR MEDS BY ELIG CLIN: HCPCS | Performed by: OBSTETRICS & GYNECOLOGY

## 2024-11-14 RX ORDER — MULTIVIT WITH MINERALS/LUTEIN
250 TABLET ORAL DAILY
COMMUNITY

## 2024-11-14 NOTE — PROGRESS NOTES
Britney Carlin  2024  1:28 PM          Britney Carlin is a 84 y.o. female       The patient was seen. She has no chief complaints today. She has been fitted for a # 3; ring with support type pessary.  She states all of her symptoms that she had prior to the pessary have been relieved with its use.  She denies any vaginal bleeding.  She denies to any vaginal discharge or odor. Her bowels are regular and her voiding pattern is normal.     Blood pressure 112/62, pulse 68, resp. rate 16, height 1.702 m (5' 7\"), weight 74.4 kg (164 lb), SpO2 98%, not currently breastfeeding.    Chaperone for Intimate Exam  Chaperone was offered and accepted as part of the rooming process.  Chaperone: April        Abdomen: Soft and non-tender; good bowel sounds; no guarding, rebound or rigidity; no CVA tenderness bilaterally.    Extremities: No calf tenderness bilaterally. DTR 2/4 bilaterally. No edema.    Perineum/Speculum: There is not any signs of infection; The vaginal vault is without any signs of erythema or erosion. There is no vaginal discharge or odor appreciated.    The pessary was cleansed and replaced without any problems and the patient tolerated the procedure well. T.O.S. Ointment was placed with the pessary to decrease discharge/odor.    Assessment:   Diagnosis Orders   1. Encounter for pessary maintenance        2. Presence of pessary #3 Ring with Support        3. Pessary maintenance          Chief Complaint   Patient presents with    Follow-up     pessary     Patient Active Problem List    Diagnosis Date Noted    Presence of pessary #3 Ring with Support 2021     Priority: High    Cystocele with uterine descensus 2013     Priority: Medium    Pessary maintenance 2021    Osteoporosis 2013    Hypertension 2013         Plan:  1. Return to the office 10-12 weeks  2. Report any vaginal bleeding or discharge  3. Abstinence  4. Annual Follow-up reviewed with patient. They will

## 2025-02-20 ENCOUNTER — OFFICE VISIT (OUTPATIENT)
Dept: OBGYN | Age: 85
End: 2025-02-20

## 2025-02-20 VITALS
SYSTOLIC BLOOD PRESSURE: 122 MMHG | BODY MASS INDEX: 25.9 KG/M2 | HEART RATE: 76 BPM | WEIGHT: 165 LBS | HEIGHT: 67 IN | DIASTOLIC BLOOD PRESSURE: 80 MMHG | RESPIRATION RATE: 18 BRPM

## 2025-02-20 DIAGNOSIS — Z46.89 ENCOUNTER FOR PESSARY MAINTENANCE: Primary | ICD-10-CM

## 2025-02-20 DIAGNOSIS — Z96.0 PRESENCE OF PESSARY: ICD-10-CM

## 2025-02-20 NOTE — PROGRESS NOTES
Britney Carlin  2025  12:10 PM          Britney Carlin is a 84 y.o. female       The patient was seen. She has no chief complaints today. She has been fitted for a # 3; ring with support type pessary.  She states all of her symptoms that she had prior to the pessary have been relieved with its use.  She denies any vaginal bleeding.  She denies to any vaginal discharge or odor. Her bowels are regular and her voiding pattern is normal.     Blood pressure 122/80, pulse 76, resp. rate 18, height 1.702 m (5' 7\"), weight 74.8 kg (165 lb), not currently breastfeeding.    Chaperone for Intimate Exam  Chaperone was offered and accepted as part of the rooming process.  Chaperone: Kathryn        Abdomen: Soft and non-tender; good bowel sounds; no guarding, rebound or rigidity; no CVA tenderness bilaterally.    Extremities: No calf tenderness bilaterally. DTR 2/4 bilaterally. No edema.    Perineum/Speculum: There is not any signs of infection; The vaginal vault is without any signs of erythema or erosion. There is no vaginal discharge or odor appreciated.    The pessary was cleansed and replaced without any problems and the patient tolerated the procedure well. T.O.S. Ointment was placed with the pessary to decrease discharge/odor.    Assessment:   Diagnosis Orders   1. Encounter for pessary maintenance        2. Presence of pessary #3 Ring with Support          Chief Complaint   Patient presents with    Follow-up     Pessary.      Patient Active Problem List    Diagnosis Date Noted    Presence of pessary #3 Ring with Support 2021     Priority: High    Cystocele with uterine descensus 2013     Priority: Medium    Pessary maintenance 2021    Osteoporosis 2013    Hypertension 2013         Plan:  1. Return to the office 10-12 weeks  2. Report any vaginal bleeding or discharge  3. Abstinence  4. Annual Follow-up reviewed with patient. They will schedule appointment  5. Preventative

## 2025-06-17 ENCOUNTER — OFFICE VISIT (OUTPATIENT)
Dept: OBGYN | Age: 85
End: 2025-06-17
Payer: MEDICARE

## 2025-06-17 VITALS
SYSTOLIC BLOOD PRESSURE: 120 MMHG | OXYGEN SATURATION: 97 % | HEART RATE: 76 BPM | HEIGHT: 67 IN | DIASTOLIC BLOOD PRESSURE: 70 MMHG | WEIGHT: 163.25 LBS | BODY MASS INDEX: 25.62 KG/M2

## 2025-06-17 DIAGNOSIS — Z46.89 ENCOUNTER FOR PESSARY MAINTENANCE: Primary | ICD-10-CM

## 2025-06-17 DIAGNOSIS — Z96.0 PRESENCE OF PESSARY: ICD-10-CM

## 2025-06-17 DIAGNOSIS — N81.4 CYSTOCELE WITH UTERINE DESCENSUS: ICD-10-CM

## 2025-06-17 PROCEDURE — G8427 DOCREV CUR MEDS BY ELIG CLIN: HCPCS | Performed by: OBSTETRICS & GYNECOLOGY

## 2025-06-17 PROCEDURE — 3078F DIAST BP <80 MM HG: CPT | Performed by: OBSTETRICS & GYNECOLOGY

## 2025-06-17 PROCEDURE — G8417 CALC BMI ABV UP PARAM F/U: HCPCS | Performed by: OBSTETRICS & GYNECOLOGY

## 2025-06-17 PROCEDURE — 1036F TOBACCO NON-USER: CPT | Performed by: OBSTETRICS & GYNECOLOGY

## 2025-06-17 PROCEDURE — 1090F PRES/ABSN URINE INCON ASSESS: CPT | Performed by: OBSTETRICS & GYNECOLOGY

## 2025-06-17 PROCEDURE — 3074F SYST BP LT 130 MM HG: CPT | Performed by: OBSTETRICS & GYNECOLOGY

## 2025-06-17 PROCEDURE — 99213 OFFICE O/P EST LOW 20 MIN: CPT | Performed by: OBSTETRICS & GYNECOLOGY

## 2025-06-17 PROCEDURE — 1123F ACP DISCUSS/DSCN MKR DOCD: CPT | Performed by: OBSTETRICS & GYNECOLOGY

## 2025-06-17 PROCEDURE — G8400 PT W/DXA NO RESULTS DOC: HCPCS | Performed by: OBSTETRICS & GYNECOLOGY

## 2025-06-17 PROCEDURE — 1159F MED LIST DOCD IN RCRD: CPT | Performed by: OBSTETRICS & GYNECOLOGY

## 2025-06-17 RX ORDER — OMEPRAZOLE 20 MG/1
20 CAPSULE, DELAYED RELEASE ORAL DAILY
COMMUNITY
Start: 2025-01-16

## 2025-06-17 RX ORDER — METOPROLOL TARTRATE 100 MG/1
TABLET ORAL
COMMUNITY
Start: 2025-03-13

## 2025-06-17 NOTE — PROGRESS NOTES
Britney Carlin  2025  10:09 AM          Britney Carlin is a 85 y.o. female       The patient was seen. She has no chief complaints today. She has been fitted for a # 3; ring with support type pessary.  She states all of her symptoms that she had prior to the pessary have been relieved with its use.  She denies any vaginal bleeding.  She denies to any vaginal discharge or odor. Her bowels are regular and her voiding pattern is normal.     not currently breastfeeding.    Chaperone for Intimate Exam  Chaperone was offered and accepted as part of the rooming process.  Chaperone: April        Abdomen: Soft and non-tender; good bowel sounds; no guarding, rebound or rigidity; no CVA tenderness bilaterally.    Extremities: No calf tenderness bilaterally. DTR 2/4 bilaterally. No edema.    Perineum/Speculum: There is not any signs of infection; The vaginal vault is without any signs of erythema or erosion. There is no vaginal discharge or odor appreciated.    The pessary was cleansed and replaced without any problems and the patient tolerated the procedure well. T.O.S. Ointment was placed with the pessary to decrease discharge/odor.    Assessment:   Diagnosis Orders   1. Encounter for pessary maintenance        2. Presence of pessary #3 Ring with Support        3. Cystocele with uterine descensus          No chief complaint on file.    Patient Active Problem List    Diagnosis Date Noted    Presence of pessary #3 Ring with Support 2021     Priority: High    Cystocele with uterine descensus 2013     Priority: Medium    Pessary maintenance 2021    Osteoporosis 2013    Hypertension 2013         Plan:  1. Return to the office 10-12 weeks  2. Report any vaginal bleeding or discharge  3. Abstinence  4. Annual Follow-up reviewed with patient. They will schedule appointment  5. Preventative health follow up through PCP reviewed-pt to schedule    The patient, Britney Carlin is a 85